# Patient Record
Sex: FEMALE | Race: WHITE | Employment: OTHER | URBAN - METROPOLITAN AREA
[De-identification: names, ages, dates, MRNs, and addresses within clinical notes are randomized per-mention and may not be internally consistent; named-entity substitution may affect disease eponyms.]

---

## 2017-01-12 DIAGNOSIS — N18.30 CKD (CHRONIC KIDNEY DISEASE), STAGE III (HCC): ICD-10-CM

## 2017-01-12 DIAGNOSIS — E87.5 HYPERKALEMIA: ICD-10-CM

## 2017-03-13 ENCOUNTER — OFFICE VISIT (OUTPATIENT)
Dept: INTERNAL MEDICINE CLINIC | Age: 82
End: 2017-03-13

## 2017-03-13 VITALS
OXYGEN SATURATION: 98 % | HEIGHT: 63 IN | TEMPERATURE: 98.2 F | HEART RATE: 77 BPM | WEIGHT: 121.4 LBS | DIASTOLIC BLOOD PRESSURE: 47 MMHG | SYSTOLIC BLOOD PRESSURE: 136 MMHG | RESPIRATION RATE: 12 BRPM | BODY MASS INDEX: 21.51 KG/M2

## 2017-03-13 DIAGNOSIS — R31.29 MICROSCOPIC HEMATURIA: Primary | ICD-10-CM

## 2017-03-13 DIAGNOSIS — R03.0 TEMPORARY HIGH BLOOD PRESSURE: ICD-10-CM

## 2017-03-13 DIAGNOSIS — Z13.220 SCREENING FOR HYPERLIPIDEMIA: ICD-10-CM

## 2017-03-13 DIAGNOSIS — Z23 ENCOUNTER FOR IMMUNIZATION: ICD-10-CM

## 2017-03-13 RX ORDER — TRAVOPROST 0.04 MG/ML
SOLUTION/ DROPS OPHTHALMIC
Refills: 3 | COMMUNITY
Start: 2017-02-13 | End: 2019-09-11 | Stop reason: ALTCHOICE

## 2017-03-13 NOTE — PATIENT INSTRUCTIONS
Patient has her Advanced Directive form completed and has turned this in today. Health Maintenance Due   Topic Date Due    DTaP/Tdap/Td series (1 - Tdap) 05/13/1956    Pneumococcal 65+ High/Highest Risk (2 of 2 - PPSV23) 11/07/2016          Reduced Vision: Care Instructions  Your Care Instructions    Reduced vision can be caused by many things. These include macular degeneration and glaucoma. When you can't see as well, daily life can be more challenging. But you can do some things to stay independent and keep doing the activities you enjoy. Follow-up care is a key part of your treatment and safety. Be sure to make and go to all appointments, and call your doctor if you are having problems. It's also a good idea to know your test results and keep a list of the medicines you take. How can you care for yourself at home? Use lighting  · Point lighting at what you want to see. Don't point it at your eyes. · Add lamps where you need extra lighting. · Use curtains or shades to adjust how much natural light there is. · Use good lighting in places where you could easily fall. These include entries and stairways. Use labels  · Label things that are hard to recognize or that could be confusing. This might include medicines, spices, and foods. Use black letters on a white background. Or you can color-code the items. · Shashi Bile the positions of the temperature settings you use the most on your stove and oven. Also neli the \"on\" and \"off\" positions. · Neli the water temperatures you use on faucets in the kitchen and bathroom. To prevent overfilling a sink or bathtub, use waterproof markers or tape to neli the water level you want. Avoid falls in your home  · Replace or remove any worn carpeting. Tape down or remove area rugs. · Do not wax your floors. Use nonskid, nonglare  on smooth floors. · Remove electrical cords from areas where you need to walk. Or tape them down so you won't trip on them.   · Make sure furniture doesn't stick out into areas where you walk. Keep chairs pushed in under tables and desks. Keep all drawers closed. · Keep doors fully opened or fully closed. Don't leave them nursing home open or shut. · Use handrails on stairways and ramps. Make sure that they go beyond the top and bottom steps. Then you won't stumble if you miss a step. Use helpful technology  · Use a magnifying lens. You can buy ones that you hold. Or you can buy ones that attach to glasses. Some have lights built in.  · If your budget allows, you may want to think about a video magnifier system. These systems can make print, pictures, or other items bigger on a screen. · If you have a computer:  ¨ Try to adjust the display. You can often change how big the text and pictures appear. Then they will be easier to see and read. ¨ You may want to try special software. Some software can recognize spoken commands or change dictated speech into text. Other software allows computers to speak text and read documents. · Use large-print items. These include books, newspapers, magazines, and medicine labels. You can also listen to recordings of books. · Think about using devices made for people with low vision. Examples are clocks and watches that announce the time. There are also clocks, telephones, and calculators with extra-large buttons. Be safe while you stay active  · Ask your doctor what physical activities are safe for you. If you bend, lift things, or move fast, it may affect your health or vision. · Ask a friend to read you the instructions for a new exercise and to check your technique. · Walk with someone who can help look for things that may be a danger. · If you swim laps, use a pool that has ropes between the lanes. When should you call for help? Call your doctor now or seek immediate medical care if:  · You have a sudden change in vision.   Watch closely for changes in your health, and be sure to contact your doctor if you have new changes in either eye. Where can you learn more? Go to http://danny-shonda.info/. Enter E315 in the search box to learn more about \"Reduced Vision: Care Instructions. \"  Current as of: May 23, 2016  Content Version: 11.1  © 7678-0868 [employer]. Care instructions adapted under license by [employer] (which disclaims liability or warranty for this information). If you have questions about a medical condition or this instruction, always ask your healthcare professional. [address] any warranty or liability for your use of this information.

## 2017-03-13 NOTE — MR AVS SNAPSHOT
Visit Information Date & Time Provider Department Dept. Phone Encounter #  
 3/13/2017  9:30 AM Giuseppe Velasquez MD [facility] [address] [place] [address]  Follow-up Instructions Return in about 6 months (around 9/13/2017) for BP check. Your Appointments 9/11/2017  9:30 AM  
Office Visit with Giuseppe Velasquez MD  
[facility] [address] [place] [address] [facility]) Appt Note: 6 mth f/u  
 [address] [facility], [address] [id number] [address] [place]  
  
   
 [address], [address] Upcoming Health Maintenance Date Due DTaP/Tdap/Td series (1 - Tdap) 5/13/1956 Pneumococcal 65+ High/Highest Risk (2 of 2 - PPSV23) 11/7/2016 MEDICARE YEARLY EXAM 9/13/2017 GLAUCOMA SCREENING Q2Y 4/11/2018 Allergies as of 3/13/2017  Review Complete On: 3/13/2017 By: Giuseppe Velasquez MD  
  
 Severity Noted Reaction Type Reactions Alphagan P [Brimonidine] High 09/14/2015    Itching Codeine  10/01/2012    Nausea and Vomiting Darvocet A500 [Propoxyphene N-acetaminophen]  10/01/2012    Nausea and Vomiting Darvon [Propoxyphene]  10/01/2012    Nausea and Vomiting Demerol [Meperidine]  10/01/2012    Nausea and Vomiting Entex [Phenylephrine-guaifenesin]  10/01/2012    Nausea and Vomiting Erythromycin  10/01/2012    Nausea and Vomiting Other Medication  10/01/2012    Shortness of Breath No beta blockers Sulfa (Sulfonamide Antibiotics)  10/01/2012    Nausea and Vomiting Timolol  10/01/2012    Nausea and Vomiting Vicodin [Hydrocodone-acetaminophen]  10/01/2012    Other (comments)  
 confusion Current Immunizations  Never Reviewed No immunizations on file. Not reviewed this visit You Were Diagnosed With   
  
 Codes Comments Microscopic hematuria    -  Primary ICD-10-CM: R31.29 ICD-9-CM: 599.72 Temporary high blood pressure     ICD-10-CM: I10 
ICD-9-CM: 796.2 Vitals BP Pulse Temp Resp Height(growth percentile) Weight(growth percentile) 136/47 (BP 1 Location: Left arm, BP Patient Position: Sitting) 77 98.2 °F (36.8 °C) (Oral) 12 5' 3\" (1.6 m) 121 lb 6.4 oz (55.1 kg) SpO2 BMI OB Status Smoking Status 98% 21.51 kg/m2 Hysterectomy Never Smoker BMI and BSA Data Body Mass Index Body Surface Area  
 21.51 kg/m 2 1.56 m 2 Preferred Pharmacy Pharmacy Name Phone [id number] [phone] Your Updated Medication List  
  
   
This list is accurate as of: 3/13/17 10:01 AM.  Always use your most recent med list.  
  
  
  
  
 aspirin 81 mg Cpdr  
Take 81 mg by mouth daily. Patient taking 2 81 mg tablets a day Calcium Carbonate-Vit D3-Min 600 mg calcium- 400 unit Tab Take  by mouth every other day. multivitamin tablet Commonly known as:  ONE A DAY Take 1 Tab by mouth every other day. TRAVATAN Z 0.004 % ophthalmic solution Generic drug:  travoprost  
PLACE 1 DROP IN RIGHT EYE AT BEDTIME Follow-up Instructions Return in about 6 months (around 9/13/2017) for BP check. Patient Instructions Patient has her Advanced Directive form completed and has turned this in today. Health Maintenance Due Topic Date Due  
 DTaP/Tdap/Td series (1 - Tdap) [birth year]/13/1956  Pneumococcal 65+ High/Highest Risk (2 of 2 - PPSV23) 11/07/2016 Reduced Vision: Care Instructions Your Care Instructions Reduced vision can be caused by many things. These include macular degeneration and glaucoma. When you can't see as well, daily life can be more challenging. But you can do some things to stay independent and keep doing the activities you enjoy. Follow-up care is a key part of your treatment and safety. Be sure to make and go to all appointments, and call your doctor if you are having problems.  It's also a good idea to know your test results and keep a list of the medicines you take. How can you care for yourself at home? Use lighting · Point lighting at what you want to see. Don't point it at your eyes. · Add lamps where you need extra lighting. · Use curtains or shades to adjust how much natural light there is. · Use good lighting in places where you could easily fall. These include entries and stairways. Use labels · Label things that are hard to recognize or that could be confusing. This might include medicines, spices, and foods. Use black letters on a white background. Or you can color-code the items. · Padmini Beards the positions of the temperature settings you use the most on your stove and oven. Also neli the \"on\" and \"off\" positions. · Neli the water temperatures you use on faucets in the kitchen and bathroom. To prevent overfilling a sink or bathtub, use waterproof markers or tape to neli the water level you want. Avoid falls in your home · Replace or remove any worn carpeting. Tape down or remove area rugs. · Do not wax your floors. Use nonskid, nonglare  on smooth floors. · Remove electrical cords from areas where you need to walk. Or tape them down so you won't trip on them. · Make sure furniture doesn't stick out into areas where you walk. Keep chairs pushed in under tables and desks. Keep all drawers closed. · Keep doors fully opened or fully closed. Don't leave them [facility] open or shut. · Use handrails on stairways and ramps. Make sure that they go beyond the top and bottom steps. Then you won't stumble if you miss a step. Use helpful technology · Use a magnifying lens. You can buy ones that you hold. Or you can buy ones that attach to glasses. Some have lights built in. 
· If your budget allows, you may want to think about a video magnifier system. These systems can make print, pictures, or other items bigger on a screen. · If you have a computer: ¨ Try to adjust the display.  You can often change how big the text and pictures appear. Then they will be easier to see and read. ¨ You may want to try special software. Some software can recognize spoken commands or change dictated speech into text. Other software allows computers to speak text and read documents. · Use large-print items. These include books, newspapers, magazines, and medicine labels. You can also listen to recordings of books. · Think about using devices made for people with low vision. Examples are clocks and watches that announce the time. There are also clocks, telephones, and calculators with extra-large buttons. Be safe while you stay active · Ask your doctor what physical activities are safe for you. If you bend, lift things, or move fast, it may affect your health or vision. · Ask a friend to read you the instructions for a new exercise and to check your technique. · Walk with someone who can help look for things that may be a danger. · If you swim laps, use a pool that has ropes between the lanes. When should you call for help? Call your doctor now or seek immediate medical care if: 
· You have a sudden change in vision. Watch closely for changes in your health, and be sure to contact your doctor if you have new changes in either eye. Where can you learn more? Go to http://danny-shonda.info/. Enter T244 in the search box to learn more about \"Reduced Vision: Care Instructions. \" Current as of: May 23, 2016 Content Version: 11.1 © 3935-5903 [employer] Care instructions adapted under license by [employer] (which disclaims liability or warranty for this information). If you have questions about a medical condition or this instruction, always ask your healthcare professional. [address] any warranty or liability for your use of this information. Introducing [facility]!    
 [employer] introduces [employer] patient portal. Now you can access parts of your medical record, email your doctor's office, and request medication refills online. 1. In your internet browser, go to [email or web address] 2. Click on the First Time User? Click Here link in the Sign In box. You will see the New Member Sign Up page. 3. Enter your [employer] Access Code exactly as it appears below. You will not need to use this code after youve completed the sign-up process. If you do not sign up before the expiration date, you must request a new code. · [employer] Access Code: [email or web address] Expires: 3/27/2017  3:49 PM 
 
4. Enter the last four digits of your Social Security Number (xxxx) and Date of Birth (mm/dd/yyyy) as indicated and click Submit. You will be taken to the next sign-up page. 5. Create a [employer] ID. This will be your [employer] login ID and cannot be changed, so think of one that is secure and easy to remember. 6. Create a [employer] password. You can change your password at any time. 7. Enter your Password Reset Question and Answer. This can be used at a later time if you forget your password. 8. Enter your e-mail address. You will receive e-mail notification when new information is available in [address] 9. Click Sign Up. You can now view and download portions of your medical record. 10. Click the Download Summary menu link to download a portable copy of your medical information. If you have questions, please visit the Frequently Asked Questions section of the [employer] website. Remember, [employer] is NOT to be used for urgent needs. For medical emergencies, dial 911. Now available from your iPhone and Android! Please provide this summary of care documentation to your next provider. Your primary care clinician is listed as Antoniette Form. If you have any questions after today's visit, please call [phone].

## 2017-03-13 NOTE — ACP (ADVANCE CARE PLANNING)
Advance Care Planning (ACP) Provider Conversation Snapshot    Date of ACP Conversation: 03/13/17  Persons included in Conversation:  patient  Length of ACP Conversation in minutes:  <16 minutes (Non-Billable)    Authorized Decision Maker (if patient is incapable of making informed decisions): This person is:   Healthcare Agent/Medical Power of  under Advance Directive - her sonYenifer    For Patients with Decision Making Capacity:   Values/Goals: Exploration of values, goals, and preferences if recovery is not expected, even with continued medical treatment in the event of:  Imminent death  Severe, permanent brain injury    Conversation Outcomes / Follow-Up Plan:   Completed new Advance Directive okay; pt states that she wants to pass away naturally if death is imminent and no interventions will change this. Additionally, pt wants no life support measures in the event that pt is unaware of her surroundings or her self. She is ok with donating her organs at the time of death. This was all reiterated with the pt. All questions were answered. We reviewed the advanced directive forms page by page and completed this today in clinic.     Dr. Sam Grate  [facility] [address].  Phone: [phone]  Fax: [phone]

## 2017-03-13 NOTE — PROGRESS NOTES
Chief Complaint   Patient presents with    Hypertension     follow up     Patient has her Advanced Directive form completed and has turned this in today. 1. Have you been to the ER, urgent care clinic since your last visit? Hospitalized since your last visit? No    2. Have you seen or consulted any other health care providers outside of the [address] [place] [address] since your last visit? Include any pap smears or colon screening.  No

## 2017-09-11 ENCOUNTER — OFFICE VISIT (OUTPATIENT)
Dept: INTERNAL MEDICINE CLINIC | Age: 82
End: 2017-09-11

## 2017-09-11 VITALS
SYSTOLIC BLOOD PRESSURE: 136 MMHG | HEIGHT: 63 IN | WEIGHT: 123.6 LBS | TEMPERATURE: 97.8 F | BODY MASS INDEX: 21.9 KG/M2 | RESPIRATION RATE: 12 BRPM | HEART RATE: 75 BPM | DIASTOLIC BLOOD PRESSURE: 50 MMHG | OXYGEN SATURATION: 98 %

## 2017-09-11 DIAGNOSIS — N18.30 CKD (CHRONIC KIDNEY DISEASE), STAGE III (HCC): ICD-10-CM

## 2017-09-11 DIAGNOSIS — H40.9 GLAUCOMA, UNSPECIFIED GLAUCOMA, UNSPECIFIED LATERALITY: ICD-10-CM

## 2017-09-11 DIAGNOSIS — R31.29 MICROSCOPIC HEMATURIA: Primary | ICD-10-CM

## 2017-09-11 DIAGNOSIS — R03.0 WHITE COAT SYNDROME WITHOUT HYPERTENSION: ICD-10-CM

## 2017-09-11 DIAGNOSIS — Z13.220 SCREENING FOR HYPERLIPIDEMIA: ICD-10-CM

## 2017-09-11 NOTE — PROGRESS NOTES
Chief Complaint   Patient presents with    Hypertension     follow up     1. Have you been to the ER, urgent care clinic since your last visit? Hospitalized since your last visit? No    2. Have you seen or consulted any other health care providers outside of the [address] [place] [address] since your last visit? Include any pap smears or colon screening.  No

## 2017-09-11 NOTE — PROGRESS NOTES
INTERNISTS OF [facility]:  9/11/2017, MRN: [id number]      Lele Garcia is a 80 y.o. female and presents to clinic for Hypertension (follow up)    Subjective:   Patient is an 24-year-old female with history of cerebral atrophy per head CT findings, white coat hypertension, urethral polyp seen on cystoscopy, microscopic hematuria, glaucoma, chronic kidney disease stage III, history of TIA. 1.  Temporary elevated blood pressure: The patient has a history of elevated blood pressures in clinic. Home blood pressure logs have been unremarkable though. Her blood pressure was initially elevated today in clinic but with rest, declined to 136/50. This confirms her diagnosis of whitecoat hypertension. 2.  Glaucoma: She is followed by the ophthalmology team.  She has an appointment coming up within next month. She is on Travatan eyedrops. She reports no change in her vision since her last visit. 3.  Microscopic hematuria: She is followed by the urology team for history of microscopic hematuria. She denies dysuria and gross hematuria since her last appointment. No hematochezia, melena, or vaginal bleeding. She has a history of CKD that is thought to be secondary to underlying age related creatinine reduction. 4.  Health maintenance: The patient declined all vaccinations today. The patient had lab work done by [facility] last month. We do not have records of these results.       Patient Active Problem List    Diagnosis Date Noted    Cerebral atrophy - seen on head CT 12/27/2016    Urethral polyp - seen on cytoscopy (thought to be the source of her microscopic hematuria) 12/27/2016    Microscopic hematuria 12/27/2016    Glaucoma 07/16/2013    CKD (chronic kidney disease), stage III 07/16/2013    H/O TIA (transient ischemic attack) and stroke 06/05/2013       Current Outpatient Prescriptions   Medication Sig Dispense Refill    TRAVATAN Z 0.004 % ophthalmic solution PLACE 1 DROP IN RIGHT EYE AT BEDTIME  3  Calcium Carbonate-Vit D3-Min 600 mg calcium- 400 unit tab Take  by mouth every other day.  aspirin 81 mg CpDR Take 81 mg by mouth daily. Patient taking 2 81 mg tablets a day      multivitamin (ONE A DAY) tablet Take 1 Tab by mouth every other day. Allergies   Allergen Reactions    Alphagan P [Brimonidine] Itching    Codeine Nausea and Vomiting    Darvocet A500 [Propoxyphene N-Acetaminophen] Nausea and Vomiting    Darvon [Propoxyphene] Nausea and Vomiting    Demerol [Meperidine] Nausea and Vomiting    Entex [Phenylephrine-Guaifenesin] Nausea and Vomiting    Erythromycin Nausea and Vomiting    Other Medication Shortness of Breath     No beta blockers    Sulfa (Sulfonamide Antibiotics) Nausea and Vomiting    Timolol Nausea and Vomiting    Vicodin [Hydrocodone-Acetaminophen] Other (comments)     confusion       Past Medical History:   Diagnosis Date    Breast CA [facility]) [id number]    1984 left, 1986 right    CKD (chronic kidney disease) stage 3, GFR 30-59 ml/min     Glaucoma     TIA (transient ischemic attack) 2009       Past Surgical History:   Procedure Laterality Date    HX CATARACT REMOVAL Left 2006    [phone]       Family History   Problem Relation Age of Onset    Cancer Mother      breast    Hypertension Mother     Arthritis-osteo Mother     Dementia Mother     Hypertension Father     Heart Disease Father     Arthritis-osteo Father     Hypertension Sister     Diabetes Sister     Cancer Brother      lung    Hypertension Brother     Heart Disease Brother        Social History   Substance Use Topics    Smoking status: Never Smoker    Smokeless tobacco: Never Used    Alcohol use No       ROS   Review of Systems   Constitutional: Negative for chills and fever. HENT: Negative for ear pain and sore throat. Eyes: Negative for pain. Respiratory: Negative for cough and shortness of breath.     Cardiovascular: Negative for chest pain.   Gastrointestinal: Negative for abdominal pain, blood in stool and melena. Genitourinary: Negative for dysuria and hematuria. Musculoskeletal: Negative for joint pain and myalgias. Skin: Negative for rash. Neurological: Negative for tingling, focal weakness and headaches. Endo/Heme/Allergies: Does not bruise/bleed easily. Psychiatric/Behavioral: Negative for substance abuse. Objective     Vitals:    09/11/17 0917 09/11/17 0932   BP: 151/53 136/50   Pulse: 78 75   Resp: 12    Temp: 97.8 °F (36.6 °C)    TempSrc: Oral    SpO2: 98%    Weight: 123 lb 9.6 oz (56.1 kg)    Height: 5' 3\" (1.6 m)    PainSc:   0 - No pain        Physical Exam   Constitutional: She is oriented to person, place, and time and well-developed, well-nourished, and in no distress. HENT:   Head: Normocephalic and atraumatic. Right Ear: External ear normal.   Left Ear: External ear normal.   Nose: Nose normal.   Mouth/Throat: Oropharynx is clear and moist. No oropharyngeal exudate. Clear TMs   Eyes: Conjunctivae and EOM are normal. Right eye exhibits no discharge. Left eye exhibits no discharge. No scleral icterus. Neck: Neck supple. Cardiovascular: Normal rate, regular rhythm, normal heart sounds and intact distal pulses. Exam reveals no gallop and no friction rub. No murmur heard. Pulmonary/Chest: Effort normal and breath sounds normal. No respiratory distress. She has no wheezes. She has no rales. Abdominal: Soft. Bowel sounds are normal. She exhibits no distension. There is no tenderness. There is no rebound and no guarding. Musculoskeletal: She exhibits no edema or tenderness (BUE are NTTP). Lymphadenopathy:     She has no cervical adenopathy. Neurological: She is alert and oriented to person, place, and time. She exhibits normal muscle tone. Gait normal.   Skin: Skin is warm and dry. No erythema. Psychiatric: Affect normal.   Nursing note and vitals reviewed.       LABS   Data Review:   Lab Results Component Value Date/Time    WBC 4.5 04/03/2014 12:00 AM    HGB 15.3 04/03/2014 12:00 AM    HCT 47.3 04/03/2014 12:00 AM    PLATELET 854 66/25/2816 12:00 AM    MCV 95 04/03/2014 12:00 AM       Lab Results   Component Value Date/Time    Sodium 145 04/03/2014 12:00 AM    Potassium 4.8 04/03/2014 12:00 AM    Chloride 103 04/03/2014 12:00 AM    CO2 28 04/03/2014 12:00 AM    Glucose 89 04/03/2014 12:00 AM    BUN 17 04/03/2014 12:00 AM    Creatinine 1.05 04/03/2014 12:00 AM    BUN/Creatinine ratio 16 04/03/2014 12:00 AM    GFR est AA 59 04/03/2014 12:00 AM    GFR est non-AA 51 04/03/2014 12:00 AM    Calcium 9.8 04/03/2014 12:00 AM       Lab Results   Component Value Date/Time    Cholesterol, total 229 04/03/2014 12:00 AM    HDL Cholesterol 62 04/03/2014 12:00 AM    LDL, calculated 140 04/03/2014 12:00 AM    VLDL, calculated 27 04/03/2014 12:00 AM    Triglyceride 135 04/03/2014 12:00 AM     Assessment/Plan:   1. Microscopic hematuria: Stable. -Continue following up with the urology team.  -I am requesting a copy of her last blood test results from [facility] labs. I will review her creatinine and CBC. 2. White Coat Hypertension: Stable. -Return to clinic in 6 months to recheck her blood pressure.  -Requesting results from [facility] labs. 3. Health Maintenance:  -I encouraged patient to follow-up with the ophthalmology team for continued management of her glaucoma. There are no preventive care reminders to display for this patient. Lab review: labs are reviewed in the EHR. We are requesting copies of her most recent lab results from [facility] labs. I have discussed the diagnosis with the patient and the intended plan as seen in the above orders. The patient has received an after-visit summary and questions were answered concerning future plans. I have discussed medication side effects and warnings with the patient as well.  I have reviewed the plan of care with the patient, accepted their input and they are in agreement with the treatment goals. All questions were answered. The patient understands the plan of care. Handouts provided today with above information. Pt instructed if symptoms worsen to call the office or report to the ED for continued care. Greater than 50% of the visit time was spent in counseling and/or coordination of care. Follow-up Disposition:  Return in about 6 months (around 3/11/2018) for BP check.     Tavares Odonnell MD

## 2017-09-11 NOTE — PATIENT INSTRUCTIONS
Health Maintenance Due   Topic Date Due    DTaP/Tdap/Td series (1 - Tdap) 05/13/1956          Body Mass Index: Care Instructions  Your Care Instructions    Body mass index (BMI) can help you see if your weight is raising your risk for health problems. It uses a formula to compare how much you weigh with how tall you are. · A BMI lower than 18.5 is considered underweight. · A BMI between 18.5 and 24.9 is considered healthy. · A BMI between 25 and 29.9 is considered overweight. A BMI of 30 or higher is considered obese. If your BMI is in the normal range, it means that you have a lower risk for weight-related health problems. If your BMI is in the overweight or obese range, you may be at increased risk for weight-related health problems, such as high blood pressure, heart disease, stroke, arthritis or joint pain, and diabetes. If your BMI is in the underweight range, you may be at increased risk for health problems such as fatigue, lower protection (immunity) against illness, muscle loss, bone loss, hair loss, and hormone problems. BMI is just one measure of your risk for weight-related health problems. You may be at higher risk for health problems if you are not active, you eat an unhealthy diet, or you drink too much alcohol or use tobacco products. Follow-up care is a key part of your treatment and safety. Be sure to make and go to all appointments, and call your doctor if you are having problems. It's also a good idea to know your test results and keep a list of the medicines you take. How can you care for yourself at home? · Practice healthy eating habits. This includes eating plenty of fruits, vegetables, whole grains, lean protein, and low-fat dairy. · If your doctor recommends it, get more exercise. Walking is a good choice. Bit by bit, increase the amount you walk every day. Try for at least 30 minutes on most days of the week. · Do not smoke. Smoking can increase your risk for health problems.  If you need help quitting, talk to your doctor about stop-smoking programs and medicines. These can increase your chances of quitting for good. · Limit alcohol to 2 drinks a day for men and 1 drink a day for women. Too much alcohol can cause health problems. If you have a BMI higher than 25  · Your doctor may do other tests to check your risk for weight-related health problems. This may include measuring the distance around your waist. A waist measurement of more than 40 inches in men or 35 inches in women can increase the risk of weight-related health problems. · Talk with your doctor about steps you can take to stay healthy or improve your health. You may need to make lifestyle changes to lose weight and stay healthy, such as changing your diet and getting regular exercise. If you have a BMI lower than 18.5  · Your doctor may do other tests to check your risk for health problems. · Talk with your doctor about steps you can take to stay healthy or improve your health. You may need to make lifestyle changes to gain or maintain weight and stay healthy, such as getting more healthy foods in your diet and doing exercises to build muscle. Where can you learn more? Go to http://danny-shonda.info/. Enter S176 in the search box to learn more about \"Body Mass Index: Care Instructions. \"  Current as of: January 23, 2017  Content Version: 11.3  © 1402-2589 [employer]. Care instructions adapted under license by [employer] (which disclaims liability or warranty for this information). If you have questions about a medical condition or this instruction, always ask your healthcare professional. [place] [address] any warranty or liability for your use of this information.

## 2017-09-19 ENCOUNTER — TELEPHONE (OUTPATIENT)
Dept: INTERNAL MEDICINE CLINIC | Age: 82
End: 2017-09-19

## 2017-09-19 NOTE — TELEPHONE ENCOUNTER
Patient says at last visit Dr. Lex Abernathy gotten her results from [address] [place] [address]. Was supposed to get a copy mailed to her. Did you ever get lab results?

## 2017-09-19 NOTE — TELEPHONE ENCOUNTER
Called [facility] the only lab they have record of is bmp from August 2017 will mail to patient when we receive them

## 2018-03-12 ENCOUNTER — OFFICE VISIT (OUTPATIENT)
Dept: INTERNAL MEDICINE CLINIC | Age: 83
End: 2018-03-12

## 2018-03-12 VITALS
SYSTOLIC BLOOD PRESSURE: 142 MMHG | HEART RATE: 79 BPM | WEIGHT: 125.8 LBS | RESPIRATION RATE: 12 BRPM | HEIGHT: 63 IN | TEMPERATURE: 98.1 F | DIASTOLIC BLOOD PRESSURE: 55 MMHG | OXYGEN SATURATION: 98 % | BODY MASS INDEX: 22.29 KG/M2

## 2018-03-12 DIAGNOSIS — N18.30 CKD (CHRONIC KIDNEY DISEASE), STAGE III (HCC): ICD-10-CM

## 2018-03-12 DIAGNOSIS — Z71.89 ADVANCE CARE PLANNING: ICD-10-CM

## 2018-03-12 DIAGNOSIS — Z00.00 MEDICARE ANNUAL WELLNESS VISIT, SUBSEQUENT: Primary | ICD-10-CM

## 2018-03-12 DIAGNOSIS — H40.9 GLAUCOMA, UNSPECIFIED GLAUCOMA TYPE, UNSPECIFIED LATERALITY: ICD-10-CM

## 2018-03-12 DIAGNOSIS — Z85.3 HISTORY OF BREAST CANCER: ICD-10-CM

## 2018-03-12 DIAGNOSIS — Z13.220 SCREENING FOR HYPERLIPIDEMIA: ICD-10-CM

## 2018-03-12 DIAGNOSIS — Z13.39 SCREENING FOR ALCOHOLISM: ICD-10-CM

## 2018-03-12 NOTE — ACP (ADVANCE CARE PLANNING)
Advance Care Planning  Advance Care Planning (ACP) Provider Conversation     Date of ACP Conversation: 03/12/18  Persons included in Conversation:  patient    Authorized Decision Maker (if patient is incapable of making informed decisions): This person is:   Healthcare Agent/Medical Power of  under Advance Directive          For Patients with Decision Making Capacity:   Values/Goals: Exploration of values, goals, and preferences if recovery is not expected, even with continued medical treatment in the event of:  Imminent death  Severe, permanent brain injury    Conversation Outcomes / Follow-Up Plan:   Recommended completion of Advance Directive form after review of ACP materials and conversation with prospective healthcare agent . The patient has no changes to make to her pre-existing advanced directive.     Dr. Susan Mitchell  [facility] [address]  Phone: [phone]  Fax: [phone]

## 2018-03-12 NOTE — MR AVS SNAPSHOT
[facility] 
 
 
 [address], [address] [place] [address] [facility] 
[phone] Patient: Constance Rinne MRN: [id number] FYN:[id number] Visit Information Date & Time Provider Department Dept. Phone Encounter #  
 3/12/2018  9:00 AM Teena Singh MD [facility] [phone] [id number] Follow-up Instructions Return in about 7 months (around 10/12/2018). Your Appointments 9/11/2018  9:00 AM  
Office Visit with Teena Singh MD  
[facility]) Appt Note: ov per [facility] [address], [address] [place] [address] [address] [place]  
  
   
 [address], [address] Upcoming Health Maintenance Date Due  
 MEDICARE YEARLY EXAM 9/13/2017 GLAUCOMA SCREENING Q2Y 4/11/2018 DTaP/Tdap/Td series (2 - Td) 3/12/2028 Allergies as of 3/12/2018  Review Complete On: 3/12/2018 By: Teena Singh MD  
  
 Severity Noted Reaction Type Reactions Alphagan P [Brimonidine] High 09/14/2015    Itching Codeine  10/01/2012    Nausea and Vomiting Darvocet A500 [Propoxyphene N-acetaminophen]  10/01/2012    Nausea and Vomiting Darvon [Propoxyphene]  10/01/2012    Nausea and Vomiting Demerol [Meperidine]  10/01/2012    Nausea and Vomiting Entex [Phenylephrine-guaifenesin]  10/01/2012    Nausea and Vomiting Erythromycin  10/01/2012    Nausea and Vomiting Other Medication  10/01/2012    Shortness of Breath No beta blockers Sulfa (Sulfonamide Antibiotics)  10/01/2012    Nausea and Vomiting Timolol  10/01/2012    Nausea and Vomiting Vicodin [Hydrocodone-acetaminophen]  10/01/2012    Other (comments)  
 confusion Current Immunizations  Never Reviewed Name Date Pneumococcal Polysaccharide (PPSV-23) 3/13/2017 Not reviewed this visit You Were Diagnosed With   
  
 Codes Comments Medicare annual wellness visit, subsequent    -  Primary ICD-10-CM: Z00.00 ICD-9-CM: V70.0 CKD (chronic kidney disease), stage III     ICD-10-CM: N18.3 ICD-9-CM: 688. 3 Glaucoma, unspecified glaucoma type, unspecified laterality     ICD-10-CM: H40.9 ICD-9-CM: 365.9 Screening for hyperlipidemia     ICD-10-CM: Z13.220 ICD-9-CM: V77.91 Screening for alcoholism     ICD-10-CM: Z13.89 ICD-9-CM: V79.1 Vitals BP Pulse Temp Resp Height(growth percentile) Weight(growth percentile) 142/55 (BP 1 Location: Left arm, BP Patient Position: Sitting) 79 98.1 °F (36.7 °C) (Oral) 12 5' 3\" (1.6 m) 125 lb 12.8 oz (57.1 kg) SpO2 BMI OB Status Smoking Status 98% 22.28 kg/m2 Hysterectomy Never Smoker Vitals History BMI and BSA Data Body Mass Index Body Surface Area  
 22.28 kg/m 2 1.59 m 2 Preferred Pharmacy Pharmacy Name Phone [id number] Romero [phone] Your Updated Medication List  
  
   
This list is accurate as of 3/12/18  9:38 AM.  Always use your most recent med list.  
  
  
  
  
 aspirin 81 mg Cpdr  
Take 81 mg by mouth daily. Patient taking 2 81 mg tablets a day Calcium Carbonate-Vit D3-Min 600 mg calcium- 400 unit Tab Take  by mouth every other day. multivitamin tablet Commonly known as:  ONE A DAY Take 1 Tab by mouth every other day. TRAVATAN Z 0.004 % ophthalmic solution Generic drug:  travoprost  
PLACE 1 DROP IN RIGHT EYE AT BEDTIME We Performed the Following MD ANNUAL ALCOHOL SCREEN 15 MIN [id number] [facility]] Follow-up Instructions Return in about 7 months (around 10/12/2018). To-Do List   
 03/12/2018 Lab:  LIPID PANEL Around 03/12/2018 Lab:  METABOLIC PANEL, BASIC Patient Instructions Health Maintenance Due Topic Date Due  MEDICARE YEARLY EXAM  09/13/2017  GLAUCOMA SCREENING Q2Y  04/11/2018 Medicare Part B Preventive Services Limitations Recommendation Scheduled Bone Mass Measurement 
(age 72 & older, biennial) Requires diagnosis related to osteoporosis or estrogen deficiency. Biennial benefit unless patient has history of long-term glucocorticoid tx or baseline is needed because initial test was by other method This should be done at age 72 and again if on osteoporosis medication at 2-3 year intervals. Overdue Cardiovascular Screening Blood Tests (every 5 years) Total cholesterol, HDL, Triglycerides Order as a panel if possible We should check your cholesterol panel at least once every 5 years. Overdue Colorectal Cancer Screening 
-Fecal occult blood test (annual) -Flexible sigmoidoscopy (5y) 
-Screening colonoscopy (10y) -Barium Enema  Due per your Gastroenterologist's recommendations. Overdue Counseling to Prevent Tobacco Use (up to 8 sessions per year) - Counseling greater than 3 and up to 10 minutes - Counseling greater than 10 minutes Patients must be asymptomatic of tobacco-related conditions to receive as preventive service Continue with smoking cessation Diabetes Screening Tests (at least every 3 years, Medicare covers annually or at 6-month intervals for prediabetic patients) Fasting blood sugar (FBS) or glucose tolerance test (GTT) Patient must be diagnosed with one of the following: 
-Hypertension, Dyslipidemia, obesity, previous impaired FBS or GTT 
Or any two of the following: overweight, FH of diabetes, age ? 72, history of gestational diabetes, birth of baby weighing more than 9 pounds Should be done yearly Up to date Diabetes Self-Management Training (DSMT) (no USPSTF recommendation) Requires referral by treating physician for patient with diabetes or renal disease. 10 hours of initial DSMT session of no less than 30 minutes each in a continuous 12-month period. 2 hours of follow-up DSMT in subsequent years. Not applicable Glaucoma Screening (no USPSTF recommendation) Diabetes mellitus, family history, , age 48 or over,  American, age 72 or over Continue with annual eye exams. Overdue per our records Human Immunodeficiency Virus (HIV) Screening (annually for increased risk patients) HIV-1 and HIV-2 by EIA, MARGARET, rapid antibody test, or oral mucosa transudate Patient must be at increased risk for HIV infection per USPSTF guidelines or pregnant. Tests covered annually for patients at increased risk. Pregnant patients may receive up to 3 test during pregnancy. Not applicable Medical Nutrition Therapy (MNT) (for diabetes or renal disease not recommended schedule) Requires referral by treating physician for patient with diabetes or renal disease. Can be provided in same year as diabetes self-management training (DSMT), and CMS recommends medical nutrition therapy take place after DSMT. Up to 3 hours for initial year and 2 hours in subsequent years. Not applicable Shingles Vaccination A shingles vaccine is also recommended once in a lifetime after age 61 Vaccination recommended for shingles vaccination. Overdue Seasonal Influenza Vaccination (annually)  Continue with yearly \"flu\" shot annually Overdue Pneumococcal Vaccination (once after 65)  Please receive this vaccination at age 72. Overdue Hepatitis B Vaccinations (if medium/high risk) Medium/high risk factors:  End-stage renal disease, Hemophiliacs who received Factor VIII or IX concentrates, Clients of institutions for the mentally retarded, Persons who live in the same house as a HepB virus carrier, Homosexual men, Illicit injectable drug abusers. Not applicable Screening Mammography (biennial age 54-69) Annually (age 36 or over) You need a mammogram yearly to screen for breast cancer. Overdue Screening Pap Tests and Pelvic Examination (up to age 79 and after 79 if unknown history or abnormal study last 10 years) Every 24 months except high risk You need no Pap smear at this time. Ultrasound Screening for Abdominal Aortic Aneurysm (AAA) (once) Patient must be referred through IPPE and not have had a screening for abdominal aortic aneurysm before under Medicare. Limited to patients who meet one of the following criteria: 
- Men who are 73-68 years old and have smoked more than 100 cigarettes in their lifetime. 
-Anyone with a FH of AAA 
-Anyone recommended for screening by USPSTF Not applicable High Blood Pressure: Care Instructions Your Care Instructions If your blood pressure is usually above 140/90, you have high blood pressure, or hypertension. That means the top number is 140 or higher or the bottom number is 90 or higher, or both. Despite what a lot of people think, high blood pressure usually doesn't cause headaches or make you feel dizzy or lightheaded. It usually has no symptoms. But it does increase your risk for heart attack, stroke, and kidney or eye damage. The higher your blood pressure, the more your risk increases. Your doctor will give you a goal for your blood pressure. Your goal will be based on your health and your age. An example of a goal is to keep your blood pressure below 140/90. Lifestyle changes, such as eating healthy and being active, are always important to help lower blood pressure. You might also take medicine to reach your blood pressure goal. 
Follow-up care is a key part of your treatment and safety. Be sure to make and go to all appointments, and call your doctor if you are having problems. It's also a good idea to know your test results and keep a list of the medicines you take. How can you care for yourself at home? Medical treatment · If you stop taking your medicine, your blood pressure will go back up. You may take one or more types of medicine to lower your blood pressure. Be safe with medicines. Take your medicine exactly as prescribed.  Call your doctor if you think you are having a problem with your medicine. · Talk to your doctor before you start taking aspirin every day. Aspirin can help certain people lower their risk of a heart attack or stroke. But taking aspirin isn't right for everyone, because it can cause serious bleeding. · See your doctor regularly. You may need to see the doctor more often at first or until your blood pressure comes down. · If you are taking blood pressure medicine, talk to your doctor before you take decongestants or anti-inflammatory medicine, such as ibuprofen. Some of these medicines can raise blood pressure. · Learn how to check your blood pressure at home. Lifestyle changes · Stay at a healthy weight. This is especially important if you put on weight around the waist. Losing even 10 pounds can help you lower your blood pressure. · If your doctor recommends it, get more exercise. Walking is a good choice. Bit by bit, increase the amount you walk every day. Try for at least 30 minutes on most days of the week. You also may want to swim, bike, or do other activities. · Avoid or limit alcohol. Talk to your doctor about whether you can drink any alcohol. · Try to limit how much sodium you eat to less than 2,300 milligrams (mg) a day. Your doctor may ask you to try to eat less than 1,500 mg a day. · Eat plenty of fruits (such as bananas and oranges), vegetables, legumes, whole grains, and low-fat dairy products. · Lower the amount of saturated fat in your diet. Saturated fat is found in animal products such as milk, cheese, and meat. Limiting these foods may help you lose weight and also lower your risk for heart disease. · Do not smoke. Smoking increases your risk for heart attack and stroke. If you need help quitting, talk to your doctor about stop-smoking programs and medicines. These can increase your chances of quitting for good. When should you call for help? Call 911 anytime you think you may need emergency care. This may mean having symptoms that suggest that your blood pressure is causing a serious heart or blood vessel problem. Your blood pressure may be over 180/110. ? For example, call 911 if: 
? · You have symptoms of a heart attack. These may include: ¨ Chest pain or pressure, or a strange feeling in the chest. 
¨ Sweating. ¨ Shortness of breath. ¨ Nausea or vomiting. ¨ Pain, pressure, or a strange feeling in the back, neck, jaw, or upper belly or in one or both shoulders or arms. ¨ Lightheadedness or sudden weakness. ¨ A fast or irregular heartbeat. ? · You have symptoms of a stroke. These may include: 
¨ Sudden numbness, tingling, weakness, or loss of movement in your face, arm, or leg, especially on only one side of your body. ¨ Sudden vision changes. ¨ Sudden trouble speaking. ¨ Sudden confusion or trouble understanding simple statements. ¨ Sudden problems with walking or balance. ¨ A sudden, severe headache that is different from past headaches. ? · You have severe back or belly pain. ?Do not wait until your blood pressure comes down on its own. Get help right away. ?Call your doctor now or seek immediate care if: 
? · Your blood pressure is much higher than normal (such as 180/110 or higher), but you don't have symptoms. ? · You think high blood pressure is causing symptoms, such as: ¨ Severe headache. ¨ Blurry vision. ? Watch closely for changes in your health, and be sure to contact your doctor if: 
? · Your blood pressure measures 140/90 or higher at least 2 times. That means the top number is 140 or higher or the bottom number is 90 or higher, or both. ? · You think you may be having side effects from your blood pressure medicine. ? · Your blood pressure is usually normal, but it goes above normal at least 2 times. Where can you learn more? Go to http://danny-shonda.info/. Enter N842 in the search box to learn more about \"High Blood Pressure: Care Instructions. \" Current as of: September 21, 2016 Content Version: 11.4 © 4325-8090 [employer]. Care instructions adapted under license by [employer] (which disclaims liability or warranty for this information). If you have questions about a medical condition or this instruction, always ask your healthcare professional. [place] [address] any warranty or liability for your use of this information. Medicare Wellness Visit, Female The best way to live healthy is to have a healthy lifestyle by eating a well-balanced diet, exercising regularly, limiting alcohol and stopping smoking. Regular physical exams and screening tests are another way to keep healthy. Preventive exams provided by your health care provider can find health problems before they become diseases or illnesses. Preventive services including immunizations, screening tests, monitoring and exams can help you take care of your own health. All people over age 72 should have a pneumovax  and and a prevnar shot to prevent pneumonia. These are once in a lifetime unless you and your provider decide differently. All people over 65 should have a yearly flu shot and a tetanus vaccine every 10 years. A bone mass density to screen for osteoporosis or thinning of the bones should be done every 2 years after 65. Screening for diabetes mellitus with a blood sugar test should be done every year. Glaucoma is a disease of the eye due to increased ocular pressure that can lead to blindness and it should be done every year by an eye professional. 
 
Cardiovascular screening tests that check for elevated lipids (fatty part of blood) which can lead to heart disease and strokes should be done every 5 years.  
 
Colorectal screening that evaluates for blood or polyps in your colon should be done yearly as a stool test or every five years as a flexible sigmoidoscope or every 10 years as a colonoscopy up to age 76. Breast cancer screening with a mammogram is recommended biennially  for women age 54-69. Screening for cervical cancer with a pap smear and pelvic exam is recommended for women after age 72 years every 2 years up to age 79 or when the provider and patient decide to stop. If there is a history of cervical abnormalities or other increased risk for cancer then the test is recommended yearly. Hepatitis C screening is also recommended for anyone born between 80 through [address]. A shingles vaccine is also recommended once in a lifetime after age 61. Your Medicare Wellness Exam is recommended annually. Here is a list of your current Health Maintenance items with a due date: 
Health Maintenance Due Topic Date Due  
 Annual Well Visit  09/13/2017  Glaucoma Screening   04/11/2018 Introducing [facility]! [facility] introduces [employer] patient portal. Now you can access parts of your medical record, email your doctor's office, and request medication refills online. 1. In your internet browser, go to [email or web address] 2. Click on the First Time User? Click Here link in the Sign In box. You will see the New Member Sign Up page. 3. Enter your [employer] Access Code exactly as it appears below. You will not need to use this code after youve completed the sign-up process. If you do not sign up before the expiration date, you must request a new code. · [employer] Access Code: [email or web address] Expires: 6/10/2018  9:38 AM 
 
4. Enter the last four digits of your Social Security Number (xxxx) and Date of Birth (mm/dd/yyyy) as indicated and click Submit. You will be taken to the next sign-up page. 5. Create a SpectraRep ID. This will be your [employer] login ID and cannot be changed, so think of one that is secure and easy to remember. 6. Create a [employer] password. You can change your password at any time. 7. Enter your Password Reset Question and Answer. This can be used at a later time if you forget your password. 8. Enter your e-mail address. You will receive e-mail notification when new information is available in [address] 9. Click Sign Up. You can now view and download portions of your medical record. 10. Click the Download Summary menu link to download a portable copy of your medical information. If you have questions, please visit the Frequently Asked Questions section of the [employer] website. Remember, [employer] is NOT to be used for urgent needs. For medical emergencies, dial 911. Now available from your iPhone and Android! Please provide this summary of care documentation to your next provider. Your primary care clinician is listed as Roxie Wang. If you have any questions after today's visit, please call [phone].

## 2018-03-12 NOTE — PROGRESS NOTES
Chief Complaint   Patient presents with    Hypertension     follow up     1. Have you been to the ER, urgent care clinic since your last visit? Hospitalized since your last visit? No    2. Have you seen or consulted any other health care providers outside of the [address] [place] [address] since your last visit? Include any pap smears or colon screening.  No

## 2018-03-12 NOTE — PATIENT INSTRUCTIONS
Health Maintenance Due   Topic Date Due    MEDICARE YEARLY EXAM  09/13/2017    GLAUCOMA SCREENING Q2Y  04/11/2018     Medicare Part B Preventive Services Limitations Recommendation Scheduled   Bone Mass Measurement  (age 72 & older, biennial) Requires diagnosis related to osteoporosis or estrogen deficiency. Biennial benefit unless patient has history of long-term glucocorticoid tx or baseline is needed because initial test was by other method This should be done at age 72 and again if on osteoporosis medication at 2-3 year intervals. Overdue   Cardiovascular Screening Blood Tests (every 5 years)  Total cholesterol, HDL, Triglycerides Order as a panel if possible We should check your cholesterol panel at least once every 5 years. Overdue   Colorectal Cancer Screening  -Fecal occult blood test (annual)  -Flexible sigmoidoscopy (5y)  -Screening colonoscopy (10y)  -Barium Enema  Due per your Gastroenterologist's recommendations. Overdue   Counseling to Prevent Tobacco Use (up to 8 sessions per year)  - Counseling greater than 3 and up to 10 minutes  - Counseling greater than 10 minutes Patients must be asymptomatic of tobacco-related conditions to receive as preventive service Continue with smoking cessation    Diabetes Screening Tests (at least every 3 years, Medicare covers annually or at 6-month intervals for prediabetic patients)    Fasting blood sugar (FBS) or glucose tolerance test (GTT) Patient must be diagnosed with one of the following:  -Hypertension, Dyslipidemia, obesity, previous impaired FBS or GTT  Or any two of the following: overweight, FH of diabetes, age ? 72, history of gestational diabetes, birth of baby weighing more than 9 pounds Should be done yearly Up to date   Diabetes Self-Management Training (DSMT) (no USPSTF recommendation) Requires referral by treating physician for patient with diabetes or renal disease.  10 hours of initial DSMT session of no less than 30 minutes each in a continuous 12-month period. 2 hours of follow-up DSMT in subsequent years. Not applicable    Glaucoma Screening (no USPSTF recommendation) Diabetes mellitus, family history, , age 48 or over,  American, age 72 or over Continue with annual eye exams. Overdue per our records   Human Immunodeficiency Virus (HIV) Screening (annually for increased risk patients)  HIV-1 and HIV-2 by EIA, MARGARET, rapid antibody test, or oral mucosa transudate Patient must be at increased risk for HIV infection per USPSTF guidelines or pregnant. Tests covered annually for patients at increased risk. Pregnant patients may receive up to 3 test during pregnancy. Not applicable    Medical Nutrition Therapy (MNT) (for diabetes or renal disease not recommended schedule) Requires referral by treating physician for patient with diabetes or renal disease. Can be provided in same year as diabetes self-management training (DSMT), and CMS recommends medical nutrition therapy take place after DSMT. Up to 3 hours for initial year and 2 hours in subsequent years. Not applicable    Shingles Vaccination A shingles vaccine is also recommended once in a lifetime after age 61 Vaccination recommended for shingles vaccination. Overdue   Seasonal Influenza Vaccination (annually)  Continue with yearly \"flu\" shot annually Overdue   Pneumococcal Vaccination (once after 65)  Please receive this vaccination at age 72. Overdue   Hepatitis B Vaccinations (if medium/high risk) Medium/high risk factors:  End-stage renal disease,  Hemophiliacs who received Factor VIII or IX concentrates, Clients of institutions for the mentally retarded, Persons who live in the same house as a HepB virus carrier, Homosexual men, Illicit injectable drug abusers. Not applicable    Screening Mammography (biennial age 54-69) Annually (age 36 or over) You need a mammogram yearly to screen for breast cancer.  Overdue   Screening Pap Tests and Pelvic Examination (up to age 79 and after 70 if unknown history or abnormal study last 10 years) Every 24 months except high risk You need no Pap smear at this time. Ultrasound Screening for Abdominal Aortic Aneurysm (AAA) (once) Patient must be referred through IPPE and not have had a screening for abdominal aortic aneurysm before under Medicare. Limited to patients who meet one of the following criteria:  - Men who are 73-68 years old and have smoked more than 100 cigarettes in their lifetime.  -Anyone with a FH of AAA  -Anyone recommended for screening by USPSTF Not applicable           High Blood Pressure: Care Instructions  Your Care Instructions    If your blood pressure is usually above 140/90, you have high blood pressure, or hypertension. That means the top number is 140 or higher or the bottom number is 90 or higher, or both. Despite what a lot of people think, high blood pressure usually doesn't cause headaches or make you feel dizzy or lightheaded. It usually has no symptoms. But it does increase your risk for heart attack, stroke, and kidney or eye damage. The higher your blood pressure, the more your risk increases. Your doctor will give you a goal for your blood pressure. Your goal will be based on your health and your age. An example of a goal is to keep your blood pressure below 140/90. Lifestyle changes, such as eating healthy and being active, are always important to help lower blood pressure. You might also take medicine to reach your blood pressure goal.  Follow-up care is a key part of your treatment and safety. Be sure to make and go to all appointments, and call your doctor if you are having problems. It's also a good idea to know your test results and keep a list of the medicines you take. How can you care for yourself at home? Medical treatment  · If you stop taking your medicine, your blood pressure will go back up. You may take one or more types of medicine to lower your blood pressure. Be safe with medicines.  Take your medicine exactly as prescribed. Call your doctor if you think you are having a problem with your medicine. · Talk to your doctor before you start taking aspirin every day. Aspirin can help certain people lower their risk of a heart attack or stroke. But taking aspirin isn't right for everyone, because it can cause serious bleeding. · See your doctor regularly. You may need to see the doctor more often at first or until your blood pressure comes down. · If you are taking blood pressure medicine, talk to your doctor before you take decongestants or anti-inflammatory medicine, such as ibuprofen. Some of these medicines can raise blood pressure. · Learn how to check your blood pressure at home. Lifestyle changes  · Stay at a healthy weight. This is especially important if you put on weight around the waist. Losing even 10 pounds can help you lower your blood pressure. · If your doctor recommends it, get more exercise. Walking is a good choice. Bit by bit, increase the amount you walk every day. Try for at least 30 minutes on most days of the week. You also may want to swim, bike, or do other activities. · Avoid or limit alcohol. Talk to your doctor about whether you can drink any alcohol. · Try to limit how much sodium you eat to less than 2,300 milligrams (mg) a day. Your doctor may ask you to try to eat less than 1,500 mg a day. · Eat plenty of fruits (such as bananas and oranges), vegetables, legumes, whole grains, and low-fat dairy products. · Lower the amount of saturated fat in your diet. Saturated fat is found in animal products such as milk, cheese, and meat. Limiting these foods may help you lose weight and also lower your risk for heart disease. · Do not smoke. Smoking increases your risk for heart attack and stroke. If you need help quitting, talk to your doctor about stop-smoking programs and medicines. These can increase your chances of quitting for good.   When should you call for help?  Call 911 anytime you think you may need emergency care. This may mean having symptoms that suggest that your blood pressure is causing a serious heart or blood vessel problem. Your blood pressure may be over 180/110. ? For example, call 911 if:  ? · You have symptoms of a heart attack. These may include:  ¨ Chest pain or pressure, or a strange feeling in the chest.  ¨ Sweating. ¨ Shortness of breath. ¨ Nausea or vomiting. ¨ Pain, pressure, or a strange feeling in the back, neck, jaw, or upper belly or in one or both shoulders or arms. ¨ Lightheadedness or sudden weakness. ¨ A fast or irregular heartbeat. ? · You have symptoms of a stroke. These may include:  ¨ Sudden numbness, tingling, weakness, or loss of movement in your face, arm, or leg, especially on only one side of your body. ¨ Sudden vision changes. ¨ Sudden trouble speaking. ¨ Sudden confusion or trouble understanding simple statements. ¨ Sudden problems with walking or balance. ¨ A sudden, severe headache that is different from past headaches. ? · You have severe back or belly pain. ?Do not wait until your blood pressure comes down on its own. Get help right away. ?Call your doctor now or seek immediate care if:  ? · Your blood pressure is much higher than normal (such as 180/110 or higher), but you don't have symptoms. ? · You think high blood pressure is causing symptoms, such as:  ¨ Severe headache. ¨ Blurry vision. ? Watch closely for changes in your health, and be sure to contact your doctor if:  ? · Your blood pressure measures 140/90 or higher at least 2 times. That means the top number is 140 or higher or the bottom number is 90 or higher, or both. ? · You think you may be having side effects from your blood pressure medicine. ? · Your blood pressure is usually normal, but it goes above normal at least 2 times. Where can you learn more? Go to http://danny-shonda.info/.   Enter F762 in the search box to learn more about \"High Blood Pressure: Care Instructions. \"  Current as of: September 21, 2016  Content Version: 11.4  © 1584-7221 [employer]. Care instructions adapted under license by [employer] (which disclaims liability or warranty for this information). If you have questions about a medical condition or this instruction, always ask your healthcare professional. [address] any warranty or liability for your use of this information. Medicare Wellness Visit, Female    The best way to live healthy is to have a healthy lifestyle by eating a well-balanced diet, exercising regularly, limiting alcohol and stopping smoking. Regular physical exams and screening tests are another way to keep healthy. Preventive exams provided by your health care provider can find health problems before they become diseases or illnesses. Preventive services including immunizations, screening tests, monitoring and exams can help you take care of your own health. All people over age 72 should have a pneumovax  and and a prevnar shot to prevent pneumonia. These are once in a lifetime unless you and your provider decide differently. All people over 65 should have a yearly flu shot and a tetanus vaccine every 10 years. A bone mass density to screen for osteoporosis or thinning of the bones should be done every 2 years after 65. Screening for diabetes mellitus with a blood sugar test should be done every year. Glaucoma is a disease of the eye due to increased ocular pressure that can lead to blindness and it should be done every year by an eye professional.    Cardiovascular screening tests that check for elevated lipids (fatty part of blood) which can lead to heart disease and strokes should be done every 5 years.     Colorectal screening that evaluates for blood or polyps in your colon should be done yearly as a stool test or every five years as a flexible sigmoidoscope or every 10 years as a colonoscopy up to age 76. Breast cancer screening with a mammogram is recommended biennially  for women age 54-69. Screening for cervical cancer with a pap smear and pelvic exam is recommended for women after age 72 years every 2 years up to age 79 or when the provider and patient decide to stop. If there is a history of cervical abnormalities or other increased risk for cancer then the test is recommended yearly. Hepatitis C screening is also recommended for anyone born between 80 through [address]. A shingles vaccine is also recommended once in a lifetime after age 61. Your Medicare Wellness Exam is recommended annually.     Here is a list of your current Health Maintenance items with a due date:  Health Maintenance Due   Topic Date Due    Annual Well Visit  09/13/2017    Glaucoma Screening   04/11/2018

## 2018-03-12 NOTE — PROGRESS NOTES
[facility] OF [facility]:  03/12/18, [id number]      The Subsequent Medicare Annual Wellness Exam PROGRESS NOTE    This is a Subsequent Medicare Annual Wellness Exam (AWV). I have reviewed the patient's medical history in detail and updated the computerized patient record. Jason Almanza is a 80 y.o.  female and presents for an annual wellness exam.    SUBJECTIVE  The pt has no acute complaints today. Past Medical History:   Diagnosis Date    Breast CA [facility]) [id number]    1984 left, 1986 right    CKD (chronic kidney disease) stage 3, GFR 30-59 ml/min     Glaucoma     TIA (transient ischemic attack) 2009      Past Surgical History:   Procedure Laterality Date    HX CATARACT REMOVAL Left 2006    HX HYSTERECTOMY  1977   Arthea Lower MASTECTOMY  [id number]     Current Outpatient Prescriptions   Medication Sig Dispense Refill    TRAVATAN Z 0.004 % ophthalmic solution PLACE 1 DROP IN RIGHT EYE AT BEDTIME  3    Calcium Carbonate-Vit D3-Min 600 mg calcium- 400 unit tab Take  by mouth every other day.  aspirin 81 mg CpDR Take 81 mg by mouth daily. Patient taking 2 81 mg tablets a day      multivitamin (ONE A DAY) tablet Take 1 Tab by mouth every other day.        Allergies   Allergen Reactions    Alphagan P [Brimonidine] Itching    Codeine Nausea and Vomiting    Darvocet A500 [Propoxyphene N-Acetaminophen] Nausea and Vomiting    Darvon [Propoxyphene] Nausea and Vomiting    Demerol [Meperidine] Nausea and Vomiting    Entex [Phenylephrine-Guaifenesin] Nausea and Vomiting    Erythromycin Nausea and Vomiting    Other Medication Shortness of Breath     No beta blockers    Sulfa (Sulfonamide Antibiotics) Nausea and Vomiting    Timolol Nausea and Vomiting    Vicodin [Hydrocodone-Acetaminophen] Other (comments)     confusion     Family History   Problem Relation Age of Onset    Cancer Mother      breast    Hypertension Mother     Arthritis-osteo Mother     Dementia Mother     Hypertension Father  Heart Disease Father     Arthritis-osteo Father     Hypertension Sister     Diabetes Sister     Cancer Brother      lung    Hypertension Brother     Heart Disease Brother      Social History   Substance Use Topics    Smoking status: Never Smoker    Smokeless tobacco: Never Used    Alcohol use No     Patient Active Problem List   Diagnosis Code    H/O TIA (transient ischemic attack) and stroke Z86.73    Glaucoma H40.9    CKD (chronic kidney disease), stage III N18.3    Cerebral atrophy - seen on head CT G31.9    Urethral polyp - seen on cytoscopy (thought to be the source of her microscopic hematuria) N36.2    Microscopic hematuria R31.29    White coat syndrome without hypertension R03.0     Patient is an 60-year-old female with history of cerebral atrophy per head CT findings, white coat hypertension, urethral polyp seen on cystoscopy, microscopic hematuria, glaucoma, chronic kidney disease stage III, history of TIA. Health Maintenance History  Immunizations reviewed: Tdap over-due, pt declines this vaccination today   Pneumovax: over-due, pt declines this vaccination today   Flu: over-due, pt declines this vaccination today  Zoster: over-due, pt declines this vaccination today    Immunization History   Administered Date(s) Administered    Pneumococcal Polysaccharide (PPSV-23) 03/13/2017     Health Maintenance Due   Topic Date Due    MEDICARE YEARLY EXAM  09/13/2017    GLAUCOMA SCREENING Q2Y  04/11/2018     Colonoscopy: No hematochezia/melena. Overdue per our records. Eye exam: She saw the eye doctor in January of 2018. Her \"eye pressure\" has come down per her hx. Mammo: +H/o mastectomy. Declines imaging studies of her breasts. In remission for breast cancer (diagnosed and treated in the 1980s). Dexascan: Overdue. Pelvic/Pap: No vaginal bleeding. Review of Systems   Constitutional: Negative for chills and fever. HENT: Negative for ear pain and sore throat.     Eyes: Negative for blurred vision and pain. Respiratory: Negative for cough and shortness of breath. Cardiovascular: Negative for chest pain. Gastrointestinal: Negative for abdominal pain, blood in stool and melena. Genitourinary: Negative for dysuria and hematuria. Musculoskeletal: Negative for joint pain and myalgias. Skin: Negative for rash. Neurological: Negative for tingling, focal weakness and headaches. Endo/Heme/Allergies: Does not bruise/bleed easily. Psychiatric/Behavioral: Negative for substance abuse. Depression Risk Factor Screening:      Patient Health Questionnaire (PHQ-2)   Over the last 2 weeks, how often have you been bothered by any of the following problems? · Little interest or pleasure in doing things? · Not at all. [0]  · Feeling down, depressed, or hopeless? · Not at all. [0]    Total Score: 0/6  PHQ-2 Assessment Scoring:   A score of 2 or more requires further screening with the PHQ-9    Alcohol Risk Factor Screening:   Women: On any occasion during the past 3 months, have you had more than 3 drinks containing alcohol? no    Do you average more than 7 drinks per week? no    Tobacco Use Screening:     History   Smoking Status    Never Smoker   Smokeless Tobacco    Never Used       Hearing Loss    Hearing is good. Activities of Daily Living   Self-care. The pt requires no assistance with ADLs/IADLs  Requires assistance with: no ADLs    Fall Risk   No fall risk factors; no falls within the past year. Abuse Screen   None    Additional Examination Findings:  Vitals:    03/12/18 0905 03/12/18 0913   BP: 151/49 142/55   Pulse: 82 79   Resp: 12    Temp: 98.1 °F (36.7 °C)    TempSrc: Oral    SpO2: 98%    Weight: 125 lb 12.8 oz (57.1 kg)    Height: 5' 3\" (1.6 m)    PainSc:   0 - No pain       Body mass index is 22.28 kg/(m^2).      Evaluation of Cognitive Function:  Mood/affect: Euthymic  Appearance: Well-groomed  Family member/caregiver input: The pt is not accompanied by a family member      General:   Well-nourished, well-groomed, pleasant, alert, in no acute distress. Head:  Normocephalic, atraumatic  Ears:  External ears WNL  Eyes:  Clear sclera  Neuro:   Alert, conversant, appropriate, following commands, no sensory deficit   Skin:    No rashes noted  Psych:  Affect, mood and judgment appropriate      Dementia Screen (Mini-Cog): Three Item Recall: 3/3  Clock Drawing (ten past eleven) Exercise: Unremarkable. LABS   Data Review:   Lab Results   Component Value Date/Time    Sodium 145 (H) 04/03/2014 12:00 AM    Potassium 4.8 04/03/2014 12:00 AM    Chloride 103 04/03/2014 12:00 AM    CO2 28 04/03/2014 12:00 AM    Glucose 89 04/03/2014 12:00 AM    BUN 17 04/03/2014 12:00 AM    Creatinine 1.05 (H) 04/03/2014 12:00 AM    BUN/Creatinine ratio 16 04/03/2014 12:00 AM    GFR est AA 59 (L) 04/03/2014 12:00 AM    GFR est non-AA 51 (L) 04/03/2014 12:00 AM    Calcium 9.8 04/03/2014 12:00 AM       Lab Results   Component Value Date/Time    WBC 4.5 04/03/2014 12:00 AM    HGB 15.3 04/03/2014 12:00 AM    HCT 47.3 (H) 04/03/2014 12:00 AM    PLATELET 370 91/12/6408 12:00 AM    MCV 95 04/03/2014 12:00 AM     Lab Results   Component Value Date/Time    Cholesterol, total 229 (H) 04/03/2014 12:00 AM    HDL Cholesterol 62 04/03/2014 12:00 AM    LDL, calculated 140 (H) 04/03/2014 12:00 AM    VLDL, calculated 27 04/03/2014 12:00 AM    Triglyceride 135 04/03/2014 12:00 AM     Patient Care Team:  Marly Foster MD as PCP - General (Family Practice)  Doe Martinez MD as Physician (Urology)    End-of-life planning  Advanced Directive in the case than an injury or illness causes the patient to be unable to make health care decisions was discussed with the patient.      Advice/Referrals/Counselling/Plan:   Education and counseling provided:  Are appropriate based on today's review and evaluation  End-of-Life planning (with patient's consent)  Pneumococcal Vaccine  Influenza Vaccine  Screening Mammography  Screening Pap and pelvic (covered once every 2 years)  Colorectal cancer screening tests  Cardiovascular screening blood test  Bone mass measurement (DEXA)  Screening for glaucoma  Diabetes screening test  Include in education list (weight loss, physical activity, smoking cessation, fall prevention, and nutrition)    ICD-10-CM ICD-9-CM    1. CKD (chronic kidney disease), stage III I82.9 104.5 METABOLIC PANEL, BASIC   2. Glaucoma, unspecified glaucoma type, unspecified laterality H40.9 365.9    3. Screening for hyperlipidemia Z13.220 V77.91 LIPID PANEL     reviewed diet, exercise and weight control. Brief written plan, checklist    Assessment/Plan:    Health maintenance:  The patient declines colon cancer screening. She also declines getting osteoporosis screening. She declines all vaccinations. The patient is amenable to getting her cholesterol checked. Given her chronic kidney disease stage III (likely age-related), the patient agreed to getting lab work right before her follow-up appointment to assess her renal function and to screen for hyperlipidemia.  - Requesting copies of her last formal eye exam.      Lab review: labs are reviewed in the [address] [place] [address] (ACP) Provider Conversation     Date of ACP Conversation: 03/12/18  Persons included in Conversation:  patient    Authorized Decision Maker (if patient is incapable of making informed decisions): This person is:   Healthcare Agent/Medical Power of  under Advance Directive          For Patients with Decision Making Capacity:   Values/Goals: Exploration of values, goals, and preferences if recovery is not expected, even with continued medical treatment in the event of:  Imminent death  Severe, permanent brain injury    Conversation Outcomes / Follow-Up Plan:   Recommended completion of Advance Directive form after review of ACP materials and conversation with prospective healthcare agent . The patient has no changes to make to her pre-existing advanced directive. I have discussed the diagnosis with the patient and the intended plan as seen in the above orders. The patient has received an after-visit summary and questions were answered concerning future plans. I have discussed medication side effects and warnings with the patient as well. I have reviewed the plan of care with the patient, accepted their input and they are in agreement with the treatment goals. Follow-up Disposition:  Return in about 7 months (around 10/12/2018).

## 2018-03-30 NOTE — MR AVS SNAPSHOT
Visit Information Date & Time Provider Department Dept. Phone Encounter #  
 9/11/2017  9:30 AM Rajni Malave MD Internist of [address] [id number] Follow-up Instructions Return in about 6 months (around 3/11/2018) for BP check. Upcoming Health Maintenance Date Due  
 MEDICARE YEARLY EXAM 9/13/2017 Pneumococcal 65+ Low/Medium Risk (2 of 2 - PCV13) 3/13/2018 GLAUCOMA SCREENING Q2Y 4/11/2018 DTaP/Tdap/Td series (2 - Td) 9/11/2027 Allergies as of 9/11/2017  Review Complete On: 9/11/2017 By: Rajni Malave MD  
  
 Severity Noted Reaction Type Reactions Alphagan P [Brimonidine] High 09/14/2015    Itching Codeine  10/01/2012    Nausea and Vomiting Darvocet A500 [Propoxyphene N-acetaminophen]  10/01/2012    Nausea and Vomiting Darvon [Propoxyphene]  10/01/2012    Nausea and Vomiting Demerol [Meperidine]  10/01/2012    Nausea and Vomiting Entex [Phenylephrine-guaifenesin]  10/01/2012    Nausea and Vomiting Erythromycin  10/01/2012    Nausea and Vomiting Other Medication  10/01/2012    Shortness of Breath No beta blockers Sulfa (Sulfonamide Antibiotics)  10/01/2012    Nausea and Vomiting Timolol  10/01/2012    Nausea and Vomiting Vicodin [Hydrocodone-acetaminophen]  10/01/2012    Other (comments)  
 confusion Current Immunizations  Never Reviewed Name Date Pneumococcal Polysaccharide (PPSV-23) 3/13/2017 Not reviewed this visit You Were Diagnosed With   
  
 Codes Comments Microscopic hematuria    -  Primary ICD-10-CM: R31.29 ICD-9-CM: 599.72 CKD (chronic kidney disease), stage III     ICD-10-CM: N18.3 ICD-9-CM: 882. 3 Screening for hyperlipidemia     ICD-10-CM: Z13.220 ICD-9-CM: V77.91 Vitals BP Pulse Temp Resp Height(growth percentile) Weight(growth percentile)  136/50 (BP 1 Location: Left arm, BP Patient Position: Sitting) 75 97.8 °F (36.6 °C) (Oral) 12 5' 3\" (1.6 m) 123 lb 9.6 oz (56.1 kg) SpO2 BMI OB Status Smoking Status 98% 21.89 kg/m2 Hysterectomy Never Smoker Vitals History BMI and BSA Data Body Mass Index Body Surface Area  
 21.89 kg/m 2 1.58 m 2 Preferred Pharmacy Pharmacy Name Phone [facility][id number]- Stephen Santos [phone] Your Updated Medication List  
  
   
This list is accurate as of: 9/11/17  9:58 AM.  Always use your most recent med list.  
  
  
  
  
 aspirin 81 mg Cpdr  
Take 81 mg by mouth daily. Patient taking 2 81 mg tablets a day Calcium Carbonate-Vit D3-Min 600 mg calcium- 400 unit Tab Take  by mouth every other day. multivitamin tablet Commonly known as:  ONE A DAY Take 1 Tab by mouth every other day. TRAVATAN Z 0.004 % ophthalmic solution Generic drug:  travoprost  
PLACE 1 DROP IN RIGHT EYE AT BEDTIME Follow-up Instructions Return in about 6 months (around 3/11/2018) for BP check. To-Do List   
 09/11/2017 Lab:  CBC WITH AUTOMATED DIFF   
  
 09/11/2017 Lab:  LIPID PANEL   
  
 09/11/2017 Lab:  METABOLIC PANEL, COMPREHENSIVE Patient Instructions Health Maintenance Due Topic Date Due  
 DTaP/Tdap/Td series (1 - Tdap) 05/13/1956 Body Mass Index: Care Instructions Your Care Instructions Body mass index (BMI) can help you see if your weight is raising your risk for health problems. It uses a formula to compare how much you weigh with how tall you are. · A BMI lower than 18.5 is considered underweight. · A BMI between 18.5 and 24.9 is considered healthy. · A BMI between 25 and 29.9 is considered overweight. A BMI of 30 or higher is considered obese. If your BMI is in the normal range, it means that you have a lower risk for weight-related health problems.  If your BMI is in the overweight or obese range, you may be at increased risk for weight-related health problems, such as high blood pressure, heart disease, stroke, arthritis or joint pain, and diabetes. If your BMI is in the underweight range, you may be at increased risk for health problems such as fatigue, lower protection (immunity) against illness, muscle loss, bone loss, hair loss, and hormone problems. BMI is just one measure of your risk for weight-related health problems. You may be at higher risk for health problems if you are not active, you eat an unhealthy diet, or you drink too much alcohol or use tobacco products. Follow-up care is a key part of your treatment and safety. Be sure to make and go to all appointments, and call your doctor if you are having problems. It's also a good idea to know your test results and keep a list of the medicines you take. How can you care for yourself at home? · Practice healthy eating habits. This includes eating plenty of fruits, vegetables, whole grains, lean protein, and low-fat dairy. · If your doctor recommends it, get more exercise. Walking is a good choice. Bit by bit, increase the amount you walk every day. Try for at least 30 minutes on most days of the week. · Do not smoke. Smoking can increase your risk for health problems. If you need help quitting, talk to your doctor about stop-smoking programs and medicines. These can increase your chances of quitting for good. · Limit alcohol to 2 drinks a day for men and 1 drink a day for women. Too much alcohol can cause health problems. If you have a BMI higher than 25 · Your doctor may do other tests to check your risk for weight-related health problems. This may include measuring the distance around your waist. A waist measurement of more than 40 inches in men or 35 inches in women can increase the risk of weight-related health problems.  
· Talk with your doctor about steps you can take to stay healthy or improve your health. You may need to make lifestyle changes to lose weight and stay healthy, such as changing your diet and getting regular exercise. If you have a BMI lower than 18.5 · Your doctor may do other tests to check your risk for health problems. · Talk with your doctor about steps you can take to stay healthy or improve your health. You may need to make lifestyle changes to gain or maintain weight and stay healthy, such as getting more healthy foods in your diet and doing exercises to build muscle. Where can you learn more? Go to http://danny-shonda.info/. Enter S176 in the search box to learn more about \"Body Mass Index: Care Instructions. \" Current as of: January 23, 2017 Content Version: 11.3 © 2524-9264 [employer]. Care instructions adapted under license by [employer] (which disclaims liability or warranty for this information). If you have questions about a medical condition or this instruction, always ask your healthcare professional. [place] [address] any warranty or liability for your use of this information. Introducing [facility]! Julieta Galeano introduces Chat& (ChatAnd) patient portal. Now you can access parts of your medical record, email your doctor's office, and request medication refills online. 1. In your internet browser, go to [email or web address] 2. Click on the First Time User? Click Here link in the Sign In box. You will see the New Member Sign Up page. 3. Enter your Chat& (ChatAnd) Access Code exactly as it appears below. You will not need to use this code after youve completed the sign-up process. If you do not sign up before the expiration date, you must request a new code. · Chat& (ChatAnd) Access Code: [email or web address] Expires: 12/10/2017  9:55 AM 
 
4. Enter the last four digits of your Social Security Number (xxxx) and Date of Birth (mm/dd/yyyy) as indicated and click Submit.  You will be taken to the next sign-up page. 5. Create a [employer] ID. This will be your [employer] login ID and cannot be changed, so think of one that is secure and easy to remember. 6. Create a [employer] password. You can change your password at any time. 7. Enter your Password Reset Question and Answer. This can be used at a later time if you forget your password. 8. Enter your e-mail address. You will receive e-mail notification when new information is available in [address] 9. Click Sign Up. You can now view and download portions of your medical record. 10. Click the Download Summary menu link to download a portable copy of your medical information. If you have questions, please visit the Frequently Asked Questions section of the [employer] website. Remember, [employer] is NOT to be used for urgent needs. For medical emergencies, dial 911. Now available from your iPhone and Android! Please provide this summary of care documentation to your next provider. Your primary care clinician is listed as Kang Pan. If you have any questions after today's visit, please call [phone]. Attending Only

## 2018-08-30 LAB
BUN SERPL-MCNC: 20 MG/DL (ref 7–25)
BUN/CREATININE RATIO,BUCR: 18 (CALC) (ref 6–22)
CALCIUM SERPL-MCNC: 9.6 MG/DL (ref 8.6–10.4)
CHLORIDE SERPL-SCNC: 104 MMOL/L (ref 98–110)
CHOL/HDL RATIO,CHHDX: 3.4 (CALC)
CHOLEST SERPL-MCNC: 197 MG/DL
CO2 SERPL-SCNC: 30 MMOL/L (ref 20–32)
CREAT SERPL-MCNC: 1.14 MG/DL (ref 0.6–0.88)
GLUCOSE SERPL-MCNC: 94 MG/DL (ref 65–99)
HDLC SERPL-MCNC: 58 MG/DL
LDL-CHOLESTEROL: 116 MG/DL (CALC)
NON-HDL CHOLESTEROL, 011976: 139 MG/DL (CALC)
POTASSIUM SERPL-SCNC: 4.9 MMOL/L (ref 3.5–5.3)
SODIUM SERPL-SCNC: 141 MMOL/L (ref 135–146)
TRIGL SERPL-MCNC: 120 MG/DL (ref ?–150)

## 2018-09-10 NOTE — PROGRESS NOTES
I will discuss her results with her tomorrow at her f/u apt. Her cholesterol is 197. Her HDL is 58. Her LDL is 116. Her renal function is about the same as it was 5 yrs ago. Her BUN is elevated at 20. Her creatinine is up to 1.14 from 1.05. She needs to drink more water.      Dr. Diego Le  [facility] of [address] [place] [address], [address] [place] [address], [address] [place] [address] Str.  Phone: [phone]  Fax: [phone]

## 2018-09-11 ENCOUNTER — OFFICE VISIT (OUTPATIENT)
Dept: INTERNAL MEDICINE CLINIC | Age: 83
End: 2018-09-11

## 2018-09-11 VITALS
BODY MASS INDEX: 22.29 KG/M2 | WEIGHT: 125.8 LBS | HEIGHT: 63 IN | RESPIRATION RATE: 12 BRPM | OXYGEN SATURATION: 97 % | TEMPERATURE: 97.7 F | SYSTOLIC BLOOD PRESSURE: 160 MMHG | DIASTOLIC BLOOD PRESSURE: 59 MMHG | HEART RATE: 77 BPM

## 2018-09-11 DIAGNOSIS — Z85.3 HISTORY OF BREAST CANCER: ICD-10-CM

## 2018-09-11 DIAGNOSIS — Z13.0 SCREENING FOR DEFICIENCY ANEMIA: ICD-10-CM

## 2018-09-11 DIAGNOSIS — H40.9 GLAUCOMA, UNSPECIFIED GLAUCOMA TYPE, UNSPECIFIED LATERALITY: ICD-10-CM

## 2018-09-11 DIAGNOSIS — R03.0 WHITE COAT SYNDROME WITHOUT HYPERTENSION: ICD-10-CM

## 2018-09-11 DIAGNOSIS — N18.30 CKD (CHRONIC KIDNEY DISEASE), STAGE III (HCC): ICD-10-CM

## 2018-09-11 DIAGNOSIS — E78.2 MIXED HYPERLIPIDEMIA: Primary | ICD-10-CM

## 2018-09-11 RX ORDER — PRAVASTATIN SODIUM 10 MG/1
10 TABLET ORAL
Qty: 30 TAB | Refills: 11 | Status: SHIPPED | OUTPATIENT
Start: 2018-09-11 | End: 2019-09-11 | Stop reason: SINTOL

## 2018-09-11 RX ORDER — NETARSUDIL 0.2 MG/ML
SOLUTION/ DROPS OPHTHALMIC; TOPICAL
Refills: 6 | COMMUNITY
Start: 2018-07-20

## 2018-09-11 NOTE — MR AVS SNAPSHOT
[facility] 
 
 
 [address] 
[phone] Patient: Gonsalo Hood MRN: [id number] XMP:[id number]/95/4730 Visit Information Date & Time Provider Department Dept. Phone Encounter #  
 9/11/2018  9:00 AM Vani Carrillo MD Internists of [address] [place] [address] [phone] Follow-up Instructions Return in about 1 year (around 9/11/2019). Upcoming Health Maintenance Date Due Influenza Age 5 to Adult 8/1/2018 GLAUCOMA SCREENING Q2Y 1/17/2020 DTaP/Tdap/Td series (2 - Td) 3/12/2028 Allergies as of 9/11/2018  Review Complete On: 9/11/2018 By: Vani Carrillo MD  
  
 Severity Noted Reaction Type Reactions Alphagan P [Brimonidine] High 09/14/2015    Itching Codeine  10/01/2012    Nausea and Vomiting Darvocet A500 [Propoxyphene N-acetaminophen]  10/01/2012    Nausea and Vomiting Darvon [Propoxyphene]  10/01/2012    Nausea and Vomiting Demerol [Meperidine]  10/01/2012    Nausea and Vomiting Entex [Phenylephrine-guaifenesin]  10/01/2012    Nausea and Vomiting Erythromycin  10/01/2012    Nausea and Vomiting Other Medication  10/01/2012    Shortness of Breath No beta blockers Sulfa (Sulfonamide Antibiotics)  10/01/2012    Nausea and Vomiting Timolol  10/01/2012    Nausea and Vomiting Vicodin [Hydrocodone-acetaminophen]  10/01/2012    Other (comments)  
 confusion Current Immunizations  Never Reviewed Name Date Pneumococcal Polysaccharide (PPSV-23) 3/13/2017 Not reviewed this visit You Were Diagnosed With   
  
 Codes Comments Mixed hyperlipidemia    -  Primary ICD-10-CM: Y45.7 ICD-9-CM: 272.2 White coat syndrome without hypertension     ICD-10-CM: R03.0 ICD-9-CM: 796.2 Screening for deficiency anemia     ICD-10-CM: Z13.0 ICD-9-CM: V78.1 Glaucoma, unspecified glaucoma type, unspecified laterality     ICD-10-CM: H40.9 ICD-9-CM: 365.9 CKD (chronic kidney disease), stage III     ICD-10-CM: N18.3 ICD-9-CM: 585.3 History of breast cancer     ICD-10-CM: Z85.3 ICD-9-CM: V10.3 Vitals BP Pulse Temp Resp Height(growth percentile) Weight(growth percentile) 160/59 (BP 1 Location: Right arm, BP Patient Position: Sitting) 77 97.7 °F (36.5 °C) (Oral) 12 5' 3\" (1.6 m) 125 lb 12.8 oz (57.1 kg) SpO2 BMI OB Status Smoking Status 97% 22.28 kg/m2 Hysterectomy Never Smoker Vitals History BMI and BSA Data Body Mass Index Body Surface Area  
 22.28 kg/m 2 1.59 m 2 Preferred Pharmacy Pharmacy Name Phone [facility]/PHARMACY #[id number]- [facility] Locke  [phone] Your Updated Medication List  
  
   
This list is accurate as of 9/11/18  9:57 AM.  Always use your most recent med list.  
  
  
  
  
 aspirin 81 mg Cpdr  
Take 81 mg by mouth daily. Patient taking 2 81 mg tablets a day Calcium Carbonate-Vit D3-Min 600 mg calcium- 400 unit Tab Take  by mouth every other day. multivitamin tablet Commonly known as:  ONE A DAY Take 1 Tab by mouth every other day. pravastatin 10 mg tablet Commonly known as:  PRAVACHOL Take 1 Tab by mouth nightly. RHOPRESSA 0.02 % Drop Generic drug:  netarsudil INSTILL 1 DROP IN RIGHT EYE DAILY DINNER  
  
 SYSTANE LIQUID GEL OP Apply  to eye daily as needed. TRAVATAN Z 0.004 % ophthalmic solution Generic drug:  travoprost  
PLACE 1 DROP IN RIGHT EYE AT BEDTIME Prescriptions Sent to Pharmacy Refills  
 pravastatin (PRAVACHOL) 10 mg tablet 11 Sig: Take 1 Tab by mouth nightly. Class: Normal  
 Pharmacy: [address] [place] [address] #: [phone] Route: Oral  
  
Follow-up Instructions Return in about 1 year (around 9/11/2019). To-Do List   
 08/11/2019 Lab:  CBC WITH AUTOMATED DIFF   
  
 08/11/2019 Lab:  LIPID PANEL   
  
 08/11/2019 Lab: METABOLIC PANEL, COMPREHENSIVE Patient Instructions Health Maintenance Due Topic Date Due  Influenza Age 5 to Adult  08/01/2018 Introducing [facility]! Trey Reddy introduces [employer] patient portal. Now you can access parts of your medical record, email your doctor's office, and request medication refills online. 1. In your internet browser, go to [email or web address] 2. Click on the First Time User? Click Here link in the Sign In box. You will see the New Member Sign Up page. 3. Enter your [employer] Access Code exactly as it appears below. You will not need to use this code after youve completed the sign-up process. If you do not sign up before the expiration date, you must request a new code. · [employer] Access Code: [email or web address] Expires: 12/10/2018  8:44 AM 
 
4. Enter the last four digits of your Social Security Number (xxxx) and Date of Birth (mm/dd/yyyy) as indicated and click Submit. You will be taken to the next sign-up page. 5. Create a [employer] ID. This will be your [employer] login ID and cannot be changed, so think of one that is secure and easy to remember. 6. Create a [employer] password. You can change your password at any time. 7. Enter your Password Reset Question and Answer. This can be used at a later time if you forget your password. 8. Enter your e-mail address. You will receive e-mail notification when new information is available in [address] 9. Click Sign Up. You can now view and download portions of your medical record. 10. Click the Download Summary menu link to download a portable copy of your medical information. If you have questions, please visit the Frequently Asked Questions section of the [employer] website. Remember, [employer] is NOT to be used for urgent needs. For medical emergencies, dial 911. Now available from your iPhone and Android! Please provide this summary of care documentation to your next provider. Your primary care clinician is listed as Meghan Acosta. If you have any questions after today's visit, please call [phone].

## 2018-09-11 NOTE — PROGRESS NOTES
Chief Complaint   Patient presents with    Hypertension     follow up    Labs     done 8-29-18     1. Have you been to the ER, urgent care clinic since your last visit? Hospitalized since your last visit? No    2. Have you seen or consulted any other health care providers outside of the [address] [place] [address] since your last visit? Include any pap smears or colon screening.  No

## 2018-09-11 NOTE — PROGRESS NOTES
INTERNISTS OF [facility]:  9/11/2018, MRN: [id number]      Param Rand is a 80 y.o. female and presents to clinic for Hypertension (follow up) and Labs (done 8-29-18)    Subjective:   Patient is an 51-year-old female with history of cerebral atrophy per head CT findings, white coat hypertension, urethral polyp seen on cystoscopy, microscopic hematuria, h/o breast cancer (declining further mammograms), glaucoma, chronic kidney disease stage III, history of TIA. 1. Temporary Elevated Blood Pressure: At her last appointment, she had an elevated BP. Today her blood pressure is 153/44. She has a h/o white coat hypertension and has not been checking her BP at home. Her most recent labs show evidence of chronic kidney disease thought to be secondary to age-related renal decline. Her creatinine is 1.14, up from 1.05 when last checked. Her BUN is elevated at 20. This suggests some dehydration. She admits not drinking a lot of water. 2. HLD: Her most recent labs show a total cholesterol of 197. HDL is 58. Her LDL is 116. Per EHR, she has a history of TIA. No new neurologic symptoms. She is asymptomatic today. Her weight is 125 pounds. 3.  History of Breast Cancer: The patient continues to adamantly decline breast cancer surveillance and mammography. She declines all cancer screenings including colon cancer screening. 4.  General:  -She declines her flu vaccine today.  -She is followed regularly by her ophthalmology team given history of glaucoma for over a year. Her last appointment with them was within the past month. Per her history, her eyedrops are working well to lower her intraocular pressures. Per her history, her pressures have dropped 2 units since her last formal eye exam earlier this year.       Patient Active Problem List    Diagnosis Date Noted    History of breast cancer 03/12/2018    White coat syndrome without hypertension 09/11/2017    Cerebral atrophy - seen on head CT 12/27/2016  Urethral polyp - seen on cytoscopy (thought to be the source of her microscopic hematuria) 12/27/2016    Microscopic hematuria 12/27/2016    Glaucoma 07/16/2013    CKD (chronic kidney disease), stage III 07/16/2013    H/O TIA (transient ischemic attack) and stroke 06/05/2013       Current Outpatient Prescriptions   Medication Sig Dispense Refill    RHOPRESSA 0.02 % drop INSTILL 1 DROP IN RIGHT EYE DAILY DINNER  6    propylene glycol/peg 400 (SYSTANE LIQUID GEL OP) Apply  to eye daily as needed.  pravastatin (PRAVACHOL) 10 mg tablet Take 1 Tab by mouth nightly. 30 Tab 11    TRAVATAN Z 0.004 % ophthalmic solution PLACE 1 DROP IN RIGHT EYE AT BEDTIME  3    Calcium Carbonate-Vit D3-Min 600 mg calcium- 400 unit tab Take  by mouth every other day.  aspirin 81 mg CpDR Take 81 mg by mouth daily. Patient taking 2 81 mg tablets a day      multivitamin (ONE A DAY) tablet Take 1 Tab by mouth every other day.          Allergies   Allergen Reactions    Alphagan P [Brimonidine] Itching    Codeine Nausea and Vomiting    Darvocet A500 [Propoxyphene N-Acetaminophen] Nausea and Vomiting    Darvon [Propoxyphene] Nausea and Vomiting    Demerol [Meperidine] Nausea and Vomiting    Entex [Phenylephrine-Guaifenesin] Nausea and Vomiting    Erythromycin Nausea and Vomiting    Other Medication Shortness of Breath     No beta blockers    Sulfa (Sulfonamide Antibiotics) Nausea and Vomiting    Timolol Nausea and Vomiting    Vicodin [Hydrocodone-Acetaminophen] Other (comments)     confusion       Past Medical History:   Diagnosis Date    Breast CA [facility]) 9089/[id number]    1984 left, 1986 right    CKD (chronic kidney disease) stage 3, GFR 30-59 ml/min     Glaucoma     TIA (transient ischemic attack) 2009       Past Surgical History:   Procedure Laterality Date    HX CATARACT REMOVAL Left 2006    HX HYSTERECTOMY  1977   Leung Ruder MASTECTOMY  [phone]       Family History   Problem Relation Age of Onset    Cancer Mother breast    Hypertension Mother     Arthritis-osteo Mother     Dementia Mother     Hypertension Father     Heart Disease Father     Arthritis-osteo Father     Hypertension Sister     Diabetes Sister     Cancer Brother      lung    Hypertension Brother     Heart Disease Brother        Social History   Substance Use Topics    Smoking status: Never Smoker    Smokeless tobacco: Never Used    Alcohol use No       ROS   Review of Systems   Constitutional: Negative for chills and fever. HENT: Negative for ear pain and sore throat. Eyes: Negative for blurred vision and pain. Respiratory: Negative for cough and shortness of breath. Cardiovascular: Negative for chest pain. Gastrointestinal: Negative for abdominal pain, blood in stool and melena. Genitourinary: Negative for dysuria and hematuria. Musculoskeletal: Negative for joint pain and myalgias. Skin: Negative for rash. Neurological: Negative for tingling, focal weakness and headaches. Endo/Heme/Allergies: Does not bruise/bleed easily. Psychiatric/Behavioral: Negative for substance abuse. Objective     Vitals:    09/11/18 0906   BP: 153/44   Pulse: 75   Resp: 12   Temp: 97.7 °F (36.5 °C)   TempSrc: Oral   SpO2: 97%   Weight: 125 lb 12.8 oz (57.1 kg)   Height: 5' 3\" (1.6 m)   PainSc:   0 - No pain       Physical Exam   Constitutional: She is oriented to person, place, and time and well-developed, well-nourished, and in no distress. HENT:   Head: Normocephalic and atraumatic. Right Ear: External ear normal.   Left Ear: External ear normal.   Nose: Nose normal.   Mouth/Throat: Oropharynx is clear and moist. No oropharyngeal exudate. Eyes: Conjunctivae and EOM are normal. Right eye exhibits no discharge. Left eye exhibits no discharge. No scleral icterus. Neck: Neck supple. Cardiovascular: Normal rate, regular rhythm, normal heart sounds and intact distal pulses. Exam reveals no gallop and no friction rub.     No murmur heard.  Pulmonary/Chest: Effort normal and breath sounds normal. No respiratory distress. She has no wheezes. She has no rales. Abdominal: Soft. Bowel sounds are normal. She exhibits no distension. Musculoskeletal: She exhibits no edema or tenderness (BUE). Lymphadenopathy:     She has no cervical adenopathy. Neurological: She is alert and oriented to person, place, and time. She exhibits normal muscle tone. Gait normal.   Skin: Skin is warm and dry. No erythema. Psychiatric: Affect normal.   Nursing note and vitals reviewed. LABS   Data Review:   Lab Results   Component Value Date/Time    WBC 4.5 04/03/2014 12:00 AM    HGB 15.3 04/03/2014 12:00 AM    HCT 47.3 (H) 04/03/2014 12:00 AM    PLATELET 372 78/51/0831 12:00 AM    MCV 95 04/03/2014 12:00 AM       Lab Results   Component Value Date/Time    Sodium 141 08/29/2018 08:04 AM    Potassium 4.9 08/29/2018 08:04 AM    Chloride 104 08/29/2018 08:04 AM    CO2 30 08/29/2018 08:04 AM    Glucose 94 08/29/2018 08:04 AM    BUN 20 08/29/2018 08:04 AM    Creatinine 1.14 (H) 08/29/2018 08:04 AM    BUN/Creatinine ratio 18 08/29/2018 08:04 AM    GFR est AA 51 (L) 08/29/2018 08:04 AM    GFR est non-AA 44 (L) 08/29/2018 08:04 AM    Calcium 9.6 08/29/2018 08:04 AM       Lab Results   Component Value Date/Time    Cholesterol, total 197 08/29/2018 08:04 AM    HDL Cholesterol 58 08/29/2018 08:04 AM    LDL-CHOLESTEROL 116 (H) 08/29/2018 08:04 AM    LDL, calculated 140 (H) 04/03/2014 12:00 AM    VLDL, calculated 27 04/03/2014 12:00 AM    Triglyceride 120 08/29/2018 08:04 AM    Cholesterol/HDL ratio 3.4 08/29/2018 08:04 AM       No results found for: HBA1C, HGBE8, PRO9OXDB, MHF5ZPNB, IAL7NELD    Assessment/Plan:   1. White coat syndrome without hypertension:  +CKD (thought to be age related decline in creatinine). Her recent elevated BUN though suggests a mild dehydration.  -I encouraged her to continue monitoring her blood pressure.   I encouraged her to check it at home and to notify me if is persistently running higher than 140/90. She was instructed to keep a blood pressure log for 2 weeks, checking her blood pressure once a day. If all pressures are greater than 140/90, I instructed her to return to clinic.  -I encouraged her to drink eight 8 ounce cups of water per day. - Checking a CMP just before her f/u apt.    2. Mixed hyperlipidemia: LDL is 116. She has a h/o TIA per EHR. -Ordering pravastatin 10 mg daily. We discussed the potential side effects of this medication. All questions were answered.  -I will check a lipid panel and a CMP just before her follow-up appointment. ORDERS:  - pravastatin (PRAVACHOL) 10 mg tablet; Take 1 Tab by mouth nightly. Dispense: 30 Tab; Refill: 11  - LIPID PANEL; Future    3. Health Maintenance:  -Ordering a CBC to screen for anemia  Just before her follow-up appointment.  -I encouraged her to continue following up with her ophthalmology team for serial eye exams and pressure checks given her history of glaucoma. Continue with Rx per their recommendations.  -I encouraged the patient to notify me if she changes her mind on getting any of the cancer screenings or vaccinations were discussed today. She declines all cancer screenings and getting the flu shot today. ORDERS:  - CBC WITH AUTOMATED DIFF; Future    4. History of breast cancer: Pt declines further cancer screenings including mammograms.  -Checking a CMP and a CBC just before her follow-up appointment. ORDERS:  - METABOLIC PANEL, COMPREHENSIVE; Future  - CBC WITH AUTOMATED DIFF; Future        Health Maintenance Due   Topic Date Due    Influenza Age 5 to Adult  08/01/2018     Lab review: labs are reviewed in the EHR and ordered as mentioned above    I have discussed the diagnosis with the patient and the intended plan as seen in the above orders. The patient has received an after-visit summary and questions were answered concerning future plans.   I have discussed medication side effects and warnings with the patient as well. I have reviewed the plan of care with the patient, accepted their input and they are in agreement with the treatment goals. All questions were answered. The patient understands the plan of care. Handouts provided today with above information. Pt instructed if symptoms worsen to call the office or report to the ED for continued care. Greater than 50% of the visit time was spent in counseling and/or coordination of care. Voice recognition was used to generate this report, which may have resulted in some phonetic based errors in grammar and contents. Even though attempts were made to correct all the mistakes, some may have been missed, and remained in the body of the document. Follow-up Disposition:  Return in about 1 year (around 9/11/2019).     Lakisha Nicholson MD

## 2018-09-16 ENCOUNTER — TELEPHONE (OUTPATIENT)
Dept: INTERNAL MEDICINE CLINIC | Age: 83
End: 2018-09-16

## 2018-09-24 DIAGNOSIS — E78.2 MIXED HYPERLIPIDEMIA: ICD-10-CM

## 2018-09-25 ENCOUNTER — TELEPHONE (OUTPATIENT)
Dept: INTERNAL MEDICINE CLINIC | Age: 83
End: 2018-09-25

## 2018-09-25 NOTE — TELEPHONE ENCOUNTER
Patient stating she was having muscle pain and tenderness, swelling of the throat and tongue, trouble swallowing. She stopped the Pravastatin and the symptoms have slowly improved. She is only having muscle pain and tenderness in her legs now. Stating she does not want another medication. She would rather manage her cholesterol with her diet.

## 2019-03-22 ENCOUNTER — HOSPITAL ENCOUNTER (OUTPATIENT)
Dept: VASCULAR SURGERY | Age: 84
Discharge: HOME | End: 2019-03-22
Attending: OPHTHALMOLOGY
Payer: MEDICARE

## 2019-03-22 DIAGNOSIS — G45.3 AMAUROSIS FUGAX OF LEFT EYE: ICD-10-CM

## 2019-03-22 PROCEDURE — 93880 EXTRACRANIAL BILAT STUDY: CPT

## 2019-03-23 LAB
LEFT CCA DIST DIAS: 8.1 CM/S
LEFT CCA DIST SYS: 94.5 CM/S
LEFT CCA MID DIAS: 10.87 CM/S
LEFT CCA MID SYS: 111.22 CM/S
LEFT CCA PROX DIAS: 19.2 CM/S
LEFT CCA PROX SYS: 144.7 CM/S
LEFT ECA DIAS: 8.08 CM/S
LEFT ECA SYS: 91.7 CM/S
LEFT ICA DIST DIAS: 12.8 CM/S
LEFT ICA DIST SYS: 87.1 CM/S
LEFT ICA MID DIAS: 12.8 CM/S
LEFT ICA MID SYS: 79 CM/S
LEFT ICA PROX DIAS: 7.9 CM/S
LEFT ICA PROX SYS: 72.5 CM/S
LEFT ICA/CCA SYS: 0.92
LEFT SUBCLAVIAN SYS: 105.4 CM/S
LEFT VERTEBRAL DIAS: 9.53 CM/S
LEFT VERTEBRAL SYS: 48.3 CM/S
RIGHT CCA DIST DIAS: 11.2 CM/S
RIGHT CCA DIST SYS: 72.5 CM/S
RIGHT CCA MID DIAS: 7.91 CM/S
RIGHT CCA MID SYS: 99.98 CM/S
RIGHT CCA PROX DIAS: 6.1 CM/S
RIGHT CCA PROX SYS: 83 CM/S
RIGHT ECA DIAS: 4.19 CM/S
RIGHT ECA SYS: 80.7 CM/S
RIGHT ICA DIST DIAS: 14.4 CM/S
RIGHT ICA DIST SYS: 71.1 CM/S
RIGHT ICA MID DIAS: 14.4 CM/S
RIGHT ICA MID SYS: 74.7 CM/S
RIGHT ICA PROX DIAS: 12.8 CM/S
RIGHT ICA PROX SYS: 64.5 CM/S
RIGHT ICA/CCA SYS: 1
RIGHT SUBCLAVIAN SYS: 125.8 CM/S
RIGHT VERTEBRAL DIAS: 6.5 CM/S
RIGHT VERTEBRAL SYS: 37.8 CM/S

## 2019-08-12 ENCOUNTER — DOCUMENTATION ONLY (OUTPATIENT)
Dept: INTERNAL MEDICINE CLINIC | Age: 84
End: 2019-08-12

## 2019-08-21 LAB
ABSOLUTE BANDS, 67058: ABNORMAL
ABSOLUTE BLASTS: ABNORMAL
ABSOLUTE METAMYELOCYTES, 900360: ABNORMAL
ABSOLUTE MYELOCYTES: ABNORMAL
ABSOLUTE NRBC,ANRBC: ABNORMAL
ABSOLUTE PROMYELOCYTES: ABNORMAL
ALB/GLOBRATIO, 58C: 1.8 (CALC) (ref 1–2.5)
ALBUMIN SERPL-MCNC: 4.4 G/DL (ref 3.6–5.1)
ALP SERPL-CCNC: 66 U/L (ref 33–130)
ALT SERPL-CCNC: 11 U/L (ref 6–29)
AST SERPL W P-5'-P-CCNC: 17 U/L (ref 10–35)
BANDS,BANDS: ABNORMAL
BASOPHILS # BLD: 83 CELLS/UL (ref 0–200)
BASOPHILS NFR BLD: 1.3 %
BILIRUB SERPL-MCNC: 0.7 MG/DL (ref 0.2–1.2)
BLASTS,BLAST: ABNORMAL
BUN SERPL-MCNC: 19 MG/DL (ref 7–25)
BUN/CREATININE RATIO,BUCR: 15 (CALC) (ref 6–22)
CALCIUM SERPL-MCNC: 9.9 MG/DL (ref 8.6–10.4)
CHLORIDE SERPL-SCNC: 104 MMOL/L (ref 98–110)
CO2 SERPL-SCNC: 29 MMOL/L (ref 20–32)
COMMENT(S): ABNORMAL
CREAT SERPL-MCNC: 1.25 MG/DL (ref 0.6–0.88)
EOSINOPHIL # BLD: 128 CELLS/UL (ref 15–500)
EOSINOPHIL NFR BLD: 2 %
ERYTHROCYTE [DISTWIDTH] IN BLOOD BY AUTOMATED COUNT: 12.9 % (ref 11–15)
GLOBULIN,GLOB: 2.4 G/DL (CALC) (ref 1.9–3.7)
GLUCOSE SERPL-MCNC: 95 MG/DL (ref 65–99)
HCT VFR BLD AUTO: 45.2 % (ref 35–45)
HGB BLD-MCNC: 14.7 G/DL (ref 11.7–15.5)
LYMPHOCYTES # BLD: 2240 CELLS/UL (ref 850–3900)
LYMPHOCYTES NFR BLD: 35 %
MCH RBC QN AUTO: 30.4 PG (ref 27–33)
MCHC RBC AUTO-ENTMCNC: 32.5 G/DL (ref 32–36)
MCV RBC AUTO: 93.4 FL (ref 80–100)
METAMYELOCYTES,METAS: ABNORMAL
MONOCYTES # BLD: 621 CELLS/UL (ref 200–950)
MONOCYTES NFR BLD: 9.7 %
MYELOCYTES,MYELO: ABNORMAL
NEUTROPHILS # BLD AUTO: 3328 CELLS/UL (ref 1500–7800)
NEUTROPHILS # BLD: 52 %
NRBC: ABNORMAL
PLATELET # BLD AUTO: 269 THOUSAND/UL (ref 140–400)
PMV BLD AUTO: 10.2 FL (ref 7.5–12.5)
POTASSIUM SERPL-SCNC: 5.1 MMOL/L (ref 3.5–5.3)
PROMYELOCYTES,PRO: ABNORMAL
PROT SERPL-MCNC: 6.8 G/DL (ref 6.1–8.1)
RBC # BLD AUTO: 4.84 MILLION/UL (ref 3.8–5.1)
REACTIVE LYMPHS: ABNORMAL
SODIUM SERPL-SCNC: 138 MMOL/L (ref 135–146)
WBC # BLD AUTO: 6.4 THOUSAND/UL (ref 3.8–10.8)

## 2019-08-25 NOTE — PROGRESS NOTES
I will let her know at her follow-up appointment that her CMP is unremarkable except for a mildly elevated creatinine of 1.25. Her BUN is 19. Meanwhile, her CBC is unremarkable for significant abnormalities.     Dr. Kristin Johnston  [facility] of [address]  Phone: [phone]  Fax: [phone]

## 2019-09-11 ENCOUNTER — OFFICE VISIT (OUTPATIENT)
Dept: INTERNAL MEDICINE CLINIC | Age: 84
End: 2019-09-11

## 2019-09-11 VITALS
SYSTOLIC BLOOD PRESSURE: 135 MMHG | HEIGHT: 63 IN | TEMPERATURE: 97.4 F | OXYGEN SATURATION: 98 % | WEIGHT: 120 LBS | BODY MASS INDEX: 21.26 KG/M2 | RESPIRATION RATE: 12 BRPM | DIASTOLIC BLOOD PRESSURE: 51 MMHG | HEART RATE: 79 BPM

## 2019-09-11 DIAGNOSIS — N18.30 CKD (CHRONIC KIDNEY DISEASE), STAGE III (HCC): Primary | ICD-10-CM

## 2019-09-11 DIAGNOSIS — Z85.3 HISTORY OF BREAST CANCER: ICD-10-CM

## 2019-09-11 DIAGNOSIS — H40.9 GLAUCOMA, UNSPECIFIED GLAUCOMA TYPE, UNSPECIFIED LATERALITY: ICD-10-CM

## 2019-09-11 DIAGNOSIS — Z23 ENCOUNTER FOR IMMUNIZATION: ICD-10-CM

## 2019-09-11 DIAGNOSIS — Z13.220 SCREENING FOR HYPERLIPIDEMIA: ICD-10-CM

## 2019-09-11 RX ORDER — BIMATOPROST 0.1 MG/ML
1 SOLUTION/ DROPS OPHTHALMIC
COMMUNITY
Start: 2019-08-21

## 2019-09-11 NOTE — PATIENT INSTRUCTIONS
Health Maintenance Due   Topic Date Due    Shingrix Vaccine Age 49> (1 of 2) 05/13/1985    Pneumococcal 65+ years (2 of 2 - PCV13) 03/13/2018          Eating Healthy Foods: Care Instructions  Your Care Instructions    Eating healthy foods can help lower your risk for disease. Healthy food gives you energy and keeps your heart strong, your brain active, your muscles working, and your bones strong. A healthy diet includes a variety of foods from the basic food groups: grains, vegetables, fruits, milk and milk products, and meat and beans. Some people may eat more of their favorite foods from only one food group and, as a result, miss getting the nutrients they need. So, it is important to pay attention not only to what you eat but also to what you are missing from your diet. You can eat a healthy, balanced diet by making a few small changes. Follow-up care is a key part of your treatment and safety. Be sure to make and go to all appointments, and call your doctor if you are having problems. It's also a good idea to know your test results and keep a list of the medicines you take. How can you care for yourself at home? Look at what you eat  · Keep a food diary for a week or two and record everything you eat or drink. Track the number of servings you eat from each food group. · For a balanced diet every day, eat a variety of:  ? 6 or more ounce-equivalents of grains, such as cereals, breads, crackers, rice, or pasta, every day. An ounce-equivalent is 1 slice of bread, 1 cup of ready-to-eat cereal, or ½ cup of cooked rice, cooked pasta, or cooked cereal.  ? 2½ cups of vegetables, especially:  § Dark-green vegetables such as broccoli and spinach. § Orange vegetables such as carrots and sweet potatoes. § Dry beans (such as han and kidney beans) and peas (such as lentils). ? 2 cups of fresh, frozen, or canned fruit. A small apple or 1 banana or orange equals 1 cup.   ? 3 cups of nonfat or low-fat milk, yogurt, or other milk products. ? 5½ ounces of meat and beans, such as chicken, fish, lean meat, beans, nuts, and seeds. One egg, 1 tablespoon of peanut butter, ½ ounce nuts or seeds, or ¼ cup of cooked beans equals 1 ounce of meat. · Learn how to read food labels for serving sizes and ingredients. Fast-food and convenience-food meals often contain few or no fruits or vegetables. Make sure you eat some fruits and vegetables to make the meal more nutritious. · Look at your food diary. For each food group, add up what you have eaten and then divide the total by the number of days. This will give you an idea of how much you are eating from each food group. See if you can find some ways to change your diet to make it more healthy. Start small  · Do not try to make dramatic changes to your diet all at once. You might feel that you are missing out on your favorite foods and then be more likely to fail. · Start slowly, and gradually change your habits. Try some of the following:  ? Use whole wheat bread instead of white bread. ? Use nonfat or low-fat milk instead of whole milk. ? Eat brown rice instead of white rice, and eat whole wheat pasta instead of white-flour pasta. ? Try low-fat cheeses and low-fat yogurt. ? Add more fruits and vegetables to meals and have them for snacks. ? Add lettuce, tomato, cucumber, and onion to sandwiches. ? Add fruit to yogurt and cereal.  Enjoy food  · You can still eat your favorite foods. You just may need to eat less of them. If your favorite foods are high in fat, salt, and sugar, limit how often you eat them, but do not cut them out entirely. · Eat a wide variety of foods. Make healthy choices when eating out  · The type of restaurant you choose can help you make healthy choices. Even fast-food chains are now offering more low-fat or healthier choices on the menu. · Choose smaller portions, or take half of your meal home. · When eating out, try:  ?  A veggie pizza with a whole wheat crust or grilled chicken (instead of sausage or pepperoni). ? Pasta with roasted vegetables, grilled chicken, or marinara sauce instead of cream sauce. ? A vegetable wrap or grilled chicken wrap. ? Broiled or poached food instead of fried or breaded items. Make healthy choices easy  · Buy packaged, prewashed, ready-to-eat fresh vegetables and fruits, such as baby carrots, salad mixes, and chopped or shredded broccoli and cauliflower. · Buy packaged, presliced fruits, such as melon or pineapple. · Choose 100% fruit or vegetable juice instead of soda. Limit juice intake to 4 to 6 oz (½ to ¾ cup) a day. · Blend low-fat yogurt, fruit juice, and canned or frozen fruit to make a smoothie for breakfast or a snack. Where can you learn more? Go to http://danny-shonda.info/. Enter T756 in the search box to learn more about \"Eating Healthy Foods: Care Instructions. \"  Current as of: November 7, 2018  Content Version: 12.1  © 9379-4054 [employer]. Care instructions adapted under license by [employer] (which disclaims liability or warranty for this information). If you have questions about a medical condition or this instruction, always ask your healthcare professional. [address] any warranty or liability for your use of this information.

## 2019-09-11 NOTE — PROGRESS NOTES
Chief Complaint   Patient presents with    Cholesterol Problem     follow up  [address]     done 8-20-19       1. Have you been to the ER, urgent care clinic since your last visit? Hospitalized since your last visit? No    2. Have you seen or consulted any other health care providers outside of the [address] [place] [address] since your last visit? Include any pap smears or colon screening.  No  Health Maintenance Due   Topic Date Due    Shingrix Vaccine Age 49> (1 of 2) 05/13/1985    Pneumococcal 65+ years (2 of 2 - PCV13) 03/13/2018

## 2019-09-11 NOTE — PROGRESS NOTES
Raya Arguello 1935 female who presents for routine immunizations. Patient denies any symptoms , reactions or allergies that would exclude them from being immunized today. Risks and adverse reactions were discussed and the VIS was given to them. All questions were addressed. Order placed for PCV 13,  per Verbal Order from DR. PALMER with read back. Patient was observed for 15 min post injection. There were no reactions observed.     Augustine Crane LPN

## 2019-09-11 NOTE — PROGRESS NOTES
INTERNISTS OF [facility]:  9/11/2019, MRN: [id number]      Aurelio Arrieta is a 80 y.o. female and presents to clinic for Cholesterol Problem (follow up  ROOM  3) and Labs (done 8-20-19)    Subjective:   Patient is an 59-year-old female with history of cerebral atrophy per head CT findings, white coat hypertension, urethral polyp seen on cystoscopy, microscopic hematuria, h/o breast cancer (declining further mammograms), glaucoma, chronic kidney disease stage III, history of TIA. 1. CKD: Her most recent labs show:  A mildly elevated creatinine of 1.25. Her BUN is 19. No urinary symptoms. 2.  Glaucoma: She continues to get eye exams every 6 months. No eye pain. She has some redness along her eyes. She is taking eyedrops x2.    3.  Whitecoat Hypertension: Her BP is 135/51 today. At home, it is even lower. She is on any antihypertensive medications. 4.  History of Breast Cancer: No breast pain. In remission. The patient has declined mammograms. Patient Active Problem List    Diagnosis Date Noted    History of breast cancer 03/12/2018    White coat syndrome without hypertension 09/11/2017    Cerebral atrophy - seen on head CT 12/27/2016    Urethral polyp - seen on cytoscopy (thought to be the source of her microscopic hematuria) 12/27/2016    Microscopic hematuria 12/27/2016    Glaucoma 07/16/2013    CKD (chronic kidney disease), stage III ([facility] [address] 07/16/2013    H/O TIA (transient ischemic attack) and stroke 06/05/2013       Current Outpatient Medications   Medication Sig Dispense Refill    LUMIGAN 0.01 % ophthalmic drops Administer 1 Drop to right eye nightly.  RHOPRESSA 0.02 % drop INSTILL 1 DROP IN RIGHT EYE DAILY DINNER  6    propylene glycol/peg 400 (SYSTANE LIQUID GEL OP) Apply  to eye daily as needed.  Calcium Carbonate-Vit D3-Min 600 mg calcium- 400 unit tab Take  by mouth every other day.  aspirin 81 mg CpDR Take 81 mg by mouth daily.  Patient taking 2 81 mg tablets a day  multivitamin (ONE A DAY) tablet Take 1 Tab by mouth every other day. Allergies   Allergen Reactions    Alphagan P [Brimonidine] Itching    Pravastatin Swelling and Myalgia     Swelling in the throat, tongue    Codeine Nausea and Vomiting    Darvocet A500 [Propoxyphene N-Acetaminophen] Nausea and Vomiting    Darvon [Propoxyphene] Nausea and Vomiting    Demerol [Meperidine] Nausea and Vomiting    Entex [Phenylephrine-Guaifenesin] Nausea and Vomiting    Erythromycin Nausea and Vomiting    Other Medication Shortness of Breath     No beta blockers    Sulfa (Sulfonamide Antibiotics) Nausea and Vomiting    Timolol Nausea and Vomiting    Vicodin [Hydrocodone-Acetaminophen] Other (comments)     confusion       Past Medical History:   Diagnosis Date    Breast CA [facility]) [id number]    1984 left, 1986 right    CKD (chronic kidney disease) stage 3, GFR 30-59 ml/min ([facility])     Glaucoma     TIA (transient ischemic attack) 2009       Past Surgical History:   Procedure Laterality Date    HX CATARACT REMOVAL Left 2006    HX HYSTERECTOMY  1977   Reggy Males MASTECTOMY  [id number]       Family History   Problem Relation Age of Onset    Cancer Mother         breast    Hypertension Mother     Arthritis-osteo Mother     Dementia Mother     Hypertension Father     Heart Disease Father     Arthritis-osteo Father     Hypertension Sister     Diabetes Sister     Cancer Brother         lung    Hypertension Brother     Heart Disease Brother        Social History     Tobacco Use    Smoking status: Never Smoker    Smokeless tobacco: Never Used   Substance Use Topics    Alcohol use: No     Alcohol/week: 0.0 standard drinks       ROS   Review of Systems   Constitutional: Negative for chills and fever. HENT: Negative for ear pain and sore throat. Eyes: Positive for redness. Negative for blurred vision and pain. Respiratory: Negative for cough and shortness of breath.     Cardiovascular: Negative for chest pain.   Gastrointestinal: Negative for abdominal pain, blood in stool and melena. Genitourinary: Negative for dysuria and hematuria. Musculoskeletal: Negative for joint pain and myalgias. Skin: Negative for rash. Neurological: Negative for tingling, focal weakness and headaches. Endo/Heme/Allergies: Does not bruise/bleed easily. Psychiatric/Behavioral: Negative for substance abuse. Objective     Vitals:    09/11/19 0902   BP: 150/52   Pulse: 79   Resp: 12   Temp: 97.4 °F (36.3 °C)   TempSrc: Oral   SpO2: 98%   Weight: 120 lb (54.4 kg)   Height: 5' 3\" (1.6 m)   PainSc:   0 - No pain       Physical Exam   Constitutional: She is oriented to person, place, and time and well-developed, well-nourished, and in no distress. HENT:   Head: Normocephalic and atraumatic. Right Ear: External ear normal.   Left Ear: External ear normal.   Nose: Nose normal.   Mouth/Throat: Oropharynx is clear and moist. No oropharyngeal exudate. Eyes: EOM are normal. Right eye exhibits no discharge. Left eye exhibits no discharge. No scleral icterus. Slightly erythematous conjunctiva b/l   Neck: Neck supple. Cardiovascular: Normal rate, regular rhythm, normal heart sounds and intact distal pulses. Exam reveals no gallop and no friction rub. No murmur heard. Pulmonary/Chest: Effort normal and breath sounds normal. No respiratory distress. She has no wheezes. She has no rales. Abdominal: Soft. Bowel sounds are normal. She exhibits no distension. There is no tenderness. There is no rebound and no guarding. Musculoskeletal: She exhibits no edema or tenderness (BUE). Lymphadenopathy:     She has no cervical adenopathy. Neurological: She is alert and oriented to person, place, and time. She exhibits normal muscle tone. Gait normal.   Skin: Skin is warm and dry. No erythema. Psychiatric: Affect normal.   Nursing note and vitals reviewed.       LABS   Data Review:   Lab Results   Component Value Date/Time    WBC 6.4 08/20/2019 08:04 AM    HGB 14.7 08/20/2019 08:04 AM    HCT 45.2 (H) 08/20/2019 08:04 AM    PLATELET 477 60/95/0026 08:04 AM    MCV 93.4 08/20/2019 08:04 AM       Lab Results   Component Value Date/Time    Sodium 138 08/20/2019 08:04 AM    Potassium 5.1 08/20/2019 08:04 AM    Chloride 104 08/20/2019 08:04 AM    CO2 29 08/20/2019 08:04 AM    Glucose 95 08/20/2019 08:04 AM    BUN 19 08/20/2019 08:04 AM    Creatinine 1.25 (H) 08/20/2019 08:04 AM    BUN/Creatinine ratio 15 08/20/2019 08:04 AM    GFR est AA 46 (L) 08/20/2019 08:04 AM    GFR est non-AA 39 (L) 08/20/2019 08:04 AM    Calcium 9.9 08/20/2019 08:04 AM       Lab Results   Component Value Date/Time    Cholesterol, total 197 08/29/2018 08:04 AM    HDL Cholesterol 58 08/29/2018 08:04 AM    LDL-CHOLESTEROL 116 (H) 08/29/2018 08:04 AM    LDL, calculated 140 (H) 04/03/2014 12:00 AM    VLDL, calculated 27 04/03/2014 12:00 AM    Triglyceride 120 08/29/2018 08:04 AM    Cholesterol/HDL ratio 3.4 08/29/2018 08:04 AM       No results found for: HBA1C, HGBE8, UYE1MGOL, RJF6LBMT    Assessment/Plan:   1. Health Maintenance:  -Ordering a lipid panel to screen for HLD just before her follow-up appointment. ORDERS:  - LIPID PANEL; Future    2. Glaucoma: Stable. -Continue get formal eye exams.  -Continue with Rx per Ophthalmology recommendations. 3. History of breast cancer: In remission.  -Checking a BMP and a CBC just before her follow-up appointment. - The pneumococcal 13 vaccine was administered today.  -I encouraged her to get all recommended vaccinations. ORDERS:  - METABOLIC PANEL, BASIC; Future  - CBC WITH AUTOMATED DIFF; Future    4. CKD (chronic kidney disease), stage III: Likely age related. -Checking a BMP and a CBC just before her follow-up appointment. ORDERS:  - METABOLIC PANEL, BASIC; Future  - CBC WITH AUTOMATED DIFF;  Future      Health Maintenance Due   Topic Date Due    Shingrix Vaccine Age 49> (1 of 2) 05/13/1985    Pneumococcal 65+ years (2 of 2 - PCV13) 03/13/2018     Lab review: labs are reviewed in the EHR    I have discussed the diagnosis with the patient and the intended plan as seen in the above orders. The patient has received an after-visit summary and questions were answered concerning future plans. I have discussed medication side effects and warnings with the patient as well. I have reviewed the plan of care with the patient, accepted their input and they are in agreement with the treatment goals. All questions were answered. The patient understands the plan of care. Handouts provided today with above information. Pt instructed if symptoms worsen to call the office or report to the ED for continued care. Greater than 50% of the visit time was spent in counseling and/or coordination of care. Voice recognition was used to generate this report, which may have resulted in some phonetic based errors in grammar and contents. Even though attempts were made to correct all the mistakes, some may have been missed, and remained in the body of the document.           Hiram Mayo MD

## 2020-08-12 ENCOUNTER — TELEPHONE (OUTPATIENT)
Dept: INTERNAL MEDICINE CLINIC | Age: 85
End: 2020-08-12

## 2020-08-12 DIAGNOSIS — E87.5 HYPERKALEMIA: Primary | ICD-10-CM

## 2020-08-12 LAB
BASOPHILS # BLD: 93 CELLS/UL (ref 0–200)
BASOPHILS NFR BLD: 1.5 %
BUN SERPL-MCNC: 22 MG/DL (ref 7–25)
BUN/CREATININE RATIO,BUCR: 20 (CALC) (ref 6–22)
CALCIUM SERPL-MCNC: 9.9 MG/DL (ref 8.6–10.4)
CHLORIDE SERPL-SCNC: 103 MMOL/L (ref 98–110)
CHOL/HDL RATIO,CHHDX: 3.3 (CALC)
CHOLEST SERPL-MCNC: 210 MG/DL
CO2 SERPL-SCNC: 29 MMOL/L (ref 20–32)
CREAT SERPL-MCNC: 1.11 MG/DL (ref 0.6–0.88)
EOSINOPHIL # BLD: 130 CELLS/UL (ref 15–500)
EOSINOPHIL NFR BLD: 2.1 %
ERYTHROCYTE [DISTWIDTH] IN BLOOD BY AUTOMATED COUNT: 12.5 % (ref 11–15)
GLUCOSE SERPL-MCNC: 90 MG/DL (ref 65–99)
HCT VFR BLD AUTO: 47.1 % (ref 35–45)
HDLC SERPL-MCNC: 64 MG/DL
HGB BLD-MCNC: 15.1 G/DL (ref 11.7–15.5)
LDL-CHOLESTEROL: 124 MG/DL (CALC)
LYMPHOCYTES # BLD: 2244 CELLS/UL (ref 850–3900)
LYMPHOCYTES NFR BLD: 36.2 %
MCH RBC QN AUTO: 29.9 PG (ref 27–33)
MCHC RBC AUTO-ENTMCNC: 32.1 G/DL (ref 32–36)
MCV RBC AUTO: 93.3 FL (ref 80–100)
MONOCYTES # BLD: 570 CELLS/UL (ref 200–950)
MONOCYTES NFR BLD: 9.2 %
NEUTROPHILS # BLD AUTO: 3162 CELLS/UL (ref 1500–7800)
NEUTROPHILS # BLD: 51 %
NON-HDL CHOLESTEROL, 011976: 146 MG/DL (CALC)
PLATELET # BLD AUTO: 251 THOUSAND/UL (ref 140–400)
PMV BLD AUTO: 10.5 FL (ref 7.5–12.5)
POTASSIUM SERPL-SCNC: 5.4 MMOL/L (ref 3.5–5.3)
RBC # BLD AUTO: 5.05 MILLION/UL (ref 3.8–5.1)
SODIUM SERPL-SCNC: 141 MMOL/L (ref 135–146)
TRIGL SERPL-MCNC: 114 MG/DL (ref ?–150)
WBC # BLD AUTO: 6.2 THOUSAND/UL (ref 3.8–10.8)

## 2020-08-12 NOTE — TELEPHONE ENCOUNTER
----- Message from Paula Leiva MD sent at 8/12/2020 11:10 AM EDT -----  Please let her know. \:  Her total cholesterol is 210. HDL is 64. Her triglycerides are 114. Her LDL is 124. Her kidney function is unchanged with a creatinine of 1.1.  BUN is 22. Her potassium was mildly elevated at 5.4, likely from lab error from hemolysis in the 2. Her CBC is unremarkable. Please schedule her for a follow-up potassium check within the next week.     Dr. Reece Sport  [facility] of [facility], [address] [place] [address], [facility] [address]  Phone: [phone]  Fax: [phone]

## 2020-08-12 NOTE — PROGRESS NOTES
Please let her know. \:  Her total cholesterol is 210. HDL is 64. Her triglycerides are 114. Her LDL is 124. Her kidney function is unchanged with a creatinine of 1.1.  BUN is 22. Her potassium was mildly elevated at 5.4, likely from lab error from hemolysis in the 2. Her CBC is unremarkable. Please schedule her for a follow-up potassium check within the next week.     Dr. Brissa Vo  Internists of [address], [facility] [address]  Phone: [phone]  Fax: [phone]

## 2020-08-12 NOTE — TELEPHONE ENCOUNTER
Pt returned call and given Dr Steiner Bottom message. Pt requested lab orders to be mailed/ she goes to [email or web address] for collection.       Lab order copied and to be mailed 08/13/2020

## 2020-08-21 ENCOUNTER — TELEPHONE (OUTPATIENT)
Dept: INTERNAL MEDICINE CLINIC | Age: 85
End: 2020-08-21

## 2020-08-21 LAB
BUN SERPL-MCNC: 19 MG/DL (ref 7–25)
BUN/CREATININE RATIO,BUCR: 16 (CALC) (ref 6–22)
CALCIUM SERPL-MCNC: 10.2 MG/DL (ref 8.6–10.4)
CHLORIDE SERPL-SCNC: 102 MMOL/L (ref 98–110)
CO2 SERPL-SCNC: 30 MMOL/L (ref 20–32)
CREAT SERPL-MCNC: 1.19 MG/DL (ref 0.6–0.88)
GLUCOSE SERPL-MCNC: 82 MG/DL (ref 65–99)
POTASSIUM SERPL-SCNC: 5.4 MMOL/L (ref 3.5–5.3)
SODIUM SERPL-SCNC: 141 MMOL/L (ref 135–146)

## 2020-08-21 NOTE — PROGRESS NOTES
Her potassium is unchanged and mildly elevated. Please have her stop her MV daily and stay hydrated, drinking water. The remaining electrolytes are normal. Please schedule her for an in office BP check to assess her pressure given her mildly elevated potassium.     Dr. Eriberto Johnson  Internists of [facility], [address] [place] [address], [address] [place] [address] Str.  Phone: [phone]  Fax: [phone]

## 2020-08-21 NOTE — TELEPHONE ENCOUNTER
----- Message from Tomasa العراقي MD sent at 8/21/2020  1:21 PM EDT -----  Her potassium is unchanged and mildly elevated. Please have her stop her MV daily and stay hydrated, drinking water. The remaining electrolytes are normal. Please schedule her for an in office BP check to assess her pressure given her mildly elevated potassium.     Dr. Eriberto Johnson  Internists of [facility], [address] [place] [address], [address] [place] [address] Str.  Phone: [phone]  Fax: [phone]

## 2020-08-25 ENCOUNTER — TELEPHONE (OUTPATIENT)
Dept: INTERNAL MEDICINE CLINIC | Age: 85
End: 2020-08-25

## 2020-08-25 NOTE — TELEPHONE ENCOUNTER
Please have her hold all of these supplements for now, pending her follow up apt with me.     Dr. Branden Raphael  Internists of [facility], [facility], [address] [place] [address] Str.  Phone: [phone]  Fax: [phone]

## 2020-08-25 NOTE — TELEPHONE ENCOUNTER
Patient dropped of a list of vitamins she is taking and wanted to know if these could be the cause of her abnormal or mildly elevated potassium or other values. Patient wanted to know once the list has been reviewed does Dr. Tisha Krabbe want her to stop her mult vit permanently. Patient states she takes this vitamin Mon, Wed, and Friday. Equate Women's Dietary Supplement    Amount per serving:  Vit A 700 mcg (10 % as beta Carotene),    Vit K 25 mcg  Vit C 75 mg                                                  Thiamine 1.2 mg  Vit D 25 mcg(1,000 I. U)                               Niacin 16 mg  Vit E 7.5 mg                                                  Riboflavin 1.3 mg  Vit B12-- 6 mcg                                             Folate 043 TSY(315 mcg Folic acid)  Vit B6-- 1.7 mcg                                            Biotin 1,000 mcg  Pantothenic Acid 5 mg                                  Calcium 380 mg  Iron 18 mg                                                     Iodine 150 mcg  Zinc 8 mg                                                     Selenium 27.5 mg  Copper 0.9 mg                                             Manganese 1.8 mg  Chromium 25 mcg    List also placed in providers box.

## 2020-08-25 NOTE — TELEPHONE ENCOUNTER
Left message for patient per Dr. Phillip Dyson hold all of these supplements for now, and bring all medications including vitamins, herb and supplements to appointment with Dr. Phillip Dyson. Any questions please call the office for an appointment.

## 2020-09-16 ENCOUNTER — VIRTUAL VISIT (OUTPATIENT)
Dept: INTERNAL MEDICINE CLINIC | Age: 85
End: 2020-09-16

## 2020-09-16 DIAGNOSIS — N18.30 CKD (CHRONIC KIDNEY DISEASE), STAGE III (HCC): Primary | ICD-10-CM

## 2020-09-16 DIAGNOSIS — H40.9 GLAUCOMA, UNSPECIFIED GLAUCOMA TYPE, UNSPECIFIED LATERALITY: ICD-10-CM

## 2020-09-16 DIAGNOSIS — E87.5 HYPERKALEMIA: ICD-10-CM

## 2020-09-16 DIAGNOSIS — E78.5 HYPERLIPIDEMIA, UNSPECIFIED HYPERLIPIDEMIA TYPE: ICD-10-CM

## 2020-09-16 DIAGNOSIS — Z86.73 H/O TIA (TRANSIENT ISCHEMIC ATTACK) AND STROKE: ICD-10-CM

## 2020-09-16 DIAGNOSIS — Z85.3 HISTORY OF BREAST CANCER: ICD-10-CM

## 2020-09-16 NOTE — PROGRESS NOTES
Isis Francisco is a 80 y.o. female who was seen by synchronous (real-time) audio-phone only technology on 9/16/2020. Assessment & Plan:   1. CKD:   - Checking labs in 6 months. 2. Glaucoma: Stable. - C/w IOP checks with Ophthlamology   - C/w rx as prescribed. 3. TIA Hx and HLD:   C/w ASA. Checking labs in 6 months. 4. Health Maintenance:  - She declined getting the flu shot.  -She wants to have a follow-up visit in the office in a year to months. I will honor her request but I instructed her to notify me if she has any changes to her body or medical issues that arise in between appointments. Meanwhile, I will check labs in 6 months as mentioned above. 5. H/o Breast Cancer:  She continues to decline surveillance studies. Lab review: labs are reviewed in the EHR and ordered as mentioned above. I have discussed the diagnosis with the patient and the intended plan as seen in the above orders. I have discussed medication side effects and warnings with the patient as well. I have reviewed the plan of care with the patient, accepted their input and they are in agreement with the treatment goals. All questions were answered. The patient understands the plan of care. Pt instructed if symptoms worsen to call the office or report to the ED for continued care. Greater than 50% of the visit time was spent in counseling and/or coordination of care. I spent 15 minutes with the patient for this phone only encounter. Voice recognition was used to generate this report, which may have resulted in some phonetic based errors in grammar and contents. Even though attempts were made to correct all the mistakes, some may have been missed, and remained in the body of the document.       Subjective:   Isis Francisco was seen for   Chief Complaint   Patient presents with    Follow-up     Patient is an 66-year-old female with history of cerebral atrophy per head CT findings, white coat hypertension, urethral polyp seen on cystoscopy, microscopic hematuria, h/o breast cancer (declining further mammograms), glaucoma, chronic kidney disease stage III, history of TIA. 1. CKD:  Creatinine is better than it was a year ago. Her potassium was mildly elevated at 5.4. She admits to eating a lot of potassium rich foods per our conversation today. Home BP checks: yes. BP is 126/69 and  HR is 66 today. No hematuria. 2. Glaucoma: Last eye exam: 8/26/20. Taking: rx eye drops. She was told to f/u with her eye doctor in 6 months. Vision changes: none. Eye pain: none. She wears glasses. 3. H/o Breast Cancer: In remission. She continues to decline surveillance studies. 4. Health Maintenance:  - Flu shot: Overdue. She declines it. 5. HLD/TIA: Not on rx to lower her cholesterol. Her total cholesterol is 210. Triglycerides are 114. HDL is 64. LDL is 124. +ASA. Overall, her cholesterol is a little worse since 2018. +H/o statin intolerance. Prior to Admission medications    Medication Sig Start Date End Date Taking? Authorizing Provider   LUMIGAN 0.01 % ophthalmic drops Administer 1 Drop to right eye nightly. 8/21/19   Provider, Historical   RHOPRESSA 0.02 % drop INSTILL 1 DROP IN RIGHT EYE DAILY DINNER 7/20/18   Provider, Historical   propylene glycol/peg 400 (SYSTANE LIQUID GEL OP) Apply  to eye daily as needed. Provider, Historical   Calcium Carbonate-Vit D3-Min 600 mg calcium- 400 unit tab Take  by mouth every other day. Provider, Historical   aspirin 81 mg CpDR Take 81 mg by mouth daily. Patient taking 2 81 mg tablets a day    Provider, Historical   multivitamin (ONE A DAY) tablet Take 1 Tab by mouth every other day.     Provider, Historical     Allergies   Allergen Reactions    Alphagan P [Brimonidine] Itching    Pravastatin Swelling and Myalgia     Swelling in the throat, tongue    Codeine Nausea and Vomiting    Darvocet A500 [Propoxyphene N-Acetaminophen] Nausea and Vomiting  Darvon [Propoxyphene] Nausea and Vomiting    Demerol [Meperidine] Nausea and Vomiting    Entex [Phenylephrine-Guaifenesin] Nausea and Vomiting    Erythromycin Nausea and Vomiting    Other Medication Shortness of Breath     No beta blockers    Sulfa (Sulfonamide Antibiotics) Nausea and Vomiting    Timolol Nausea and Vomiting    Vicodin [Hydrocodone-Acetaminophen] Other (comments)     confusion     Past Medical History:   Diagnosis Date    Breast CA [facility]) [id number]    1984 left, 1986 right    CKD (chronic kidney disease) stage 3, GFR 30-59 ml/min ([facility])     Glaucoma     TIA (transient ischemic attack) 2009     Past Surgical History:   Procedure Laterality Date    HX CATARACT REMOVAL Left 2006    HX HYSTERECTOMY  1977   Faby [place] MASTECTOMY  [id number]     Family History   Problem Relation Age of Onset    Cancer Mother         breast    Hypertension Mother     Arthritis-osteo Mother     Dementia Mother     Hypertension Father     Heart Disease Father     Arthritis-osteo Father     Hypertension Sister     Diabetes Sister     Cancer Brother         lung    Hypertension Brother     Heart Disease Brother      Social History     Socioeconomic History    Marital status:      Spouse name: Not on file    Number of children: Not on file    Years of education: Not on file    Highest education level: Not on file   Tobacco Use    Smoking status: Never Smoker    Smokeless tobacco: Never Used   Substance and Sexual Activity    Alcohol use: No     Alcohol/week: 0.0 standard drinks    Drug use: Yes     Types: Prescription, OTC    Sexual activity: Not Currently     Partners: Male   Social History Narrative    Activity level-walks 2 miles 1x week on Sunday    Engages in gardening, routine house chores without difficulty               ROS:  Gen: No fever/chills  HEENT: No sore throat, eye pain, ear pain, or congestion.  No HA  CV: No CP  Resp: No cough/SOB  GI: No abdominal pain  : No hematuria/dysuria  Derm: No rash  Neuro: No new paresthesias/weakness  Musc: No new myalgias/jt pain  Psych: No depression sx      LABS:  Lab Results   Component Value Date/Time    Sodium 141 08/20/2020 08:09 AM    Potassium 5.4 (H) 08/20/2020 08:09 AM    Chloride 102 08/20/2020 08:09 AM    CO2 30 08/20/2020 08:09 AM    Glucose 82 08/20/2020 08:09 AM    BUN 19 08/20/2020 08:09 AM    Creatinine 1.19 (H) 08/20/2020 08:09 AM    BUN/Creatinine ratio 16 08/20/2020 08:09 AM    GFR est AA 48 (L) 08/20/2020 08:09 AM    GFR est non-AA 42 (L) 08/20/2020 08:09 AM    Calcium 10.2 08/20/2020 08:09 AM       Lab Results   Component Value Date/Time    Cholesterol, total 210 (H) 08/11/2020 08:06 AM    HDL Cholesterol 64 08/11/2020 08:06 AM    LDL-CHOLESTEROL 124 (H) 08/11/2020 08:06 AM    LDL, calculated 140 (H) 04/03/2014 12:00 AM    VLDL, calculated 27 04/03/2014 12:00 AM    Triglyceride 114 08/11/2020 08:06 AM    Cholesterol/HDL ratio 3.3 08/11/2020 08:06 AM       Lab Results   Component Value Date/Time    WBC 6.2 08/11/2020 08:06 AM    HGB 15.1 08/11/2020 08:06 AM    HCT 47.1 (H) 08/11/2020 08:06 AM    PLATELET 893 20/92/9912 08:06 AM    MCV 93.3 08/11/2020 08:06 AM       No results found for: HBA1C, HGBE8, FGH7KDQB, CEC1SGPP    Lab Results   Component Value Date/Time    TSH 3.490 04/03/2014 12:00 AM           Due to this being a TeleHealth phone only evaluation, many elements of the physical examination are unable to be assessed. The pt was seen by synchronous (real-time) audio-phone only technology, and/or her healthcare decision maker, is aware that this patient-initiated, Telehealth encounter is a billable service, with coverage as determined by her insurance carrier. She is aware that she may receive a bill and has provided verbal consent to proceed: Yes. The pt is being evaluated by a phone only visit encounter for concerns as above. A caregiver was present when appropriate.  Due to this being a TeleHealth encounter (During RNYVF-47 public health emergency), evaluation of the following organ systems was limited: Vitals/Constitutional/EENT/Resp/CV/GI//MS/Neuro/Skin/Heme-Lymph-Imm. Pursuant to the emergency declaration under the [facility], [address] [place] [address] and the [email or web address] and [employer] Act, this Virtual phone only Visit was conducted, with patient's (and/or legal guardian's) consent, to reduce the patient's risk of exposure to COVID-19 and provide necessary medical care. Services were provided through a phone only synchronous discussion virtually to substitute for in-person clinic visit. Patient and provider were located at their individual homes. We discussed the expected course, resolution and complications of the diagnosis(es) in detail. Medication risks, benefits, costs, interactions, and alternatives were discussed as indicated. I advised her to contact the office if her condition worsens, changes or fails to improve as anticipated. She expressed understanding with the diagnosis(es) and plan.      Anibal Ruiz MD

## 2021-03-21 LAB
ALB/GLOBRATIO, 58C: 1.6 (CALC) (ref 1–2.5)
ALBUMIN SERPL-MCNC: 4.1 G/DL (ref 3.6–5.1)
ALDOSTERONE, LC/MS/MS: 4 NG/DL
ALKALINE PHOSPHATASE, TOTAL, 25002000: 70 U/L (ref 37–153)
ALT SERPL-CCNC: 12 U/L (ref 6–29)
AST SERPL W P-5'-P-CCNC: 18 U/L (ref 10–35)
BASOPHILS # BLD: 72 CELLS/UL (ref 0–200)
BASOPHILS NFR BLD: 1.2 %
BILIRUB SERPL-MCNC: 0.5 MG/DL (ref 0.2–1.2)
BUN SERPL-MCNC: 20 MG/DL (ref 7–25)
BUN/CREATININE RATIO,BUCR: 19 (CALC) (ref 6–22)
CALCIUM SERPL-MCNC: 9.4 MG/DL (ref 8.6–10.4)
CHLORIDE SERPL-SCNC: 106 MMOL/L (ref 98–110)
CHOL/HDL RATIO,CHHDX: 3.2 (CALC)
CHOLEST SERPL-MCNC: 195 MG/DL
CO2 SERPL-SCNC: 30 MMOL/L (ref 20–32)
CREAT SERPL-MCNC: 1.04 MG/DL (ref 0.6–0.88)
EOSINOPHIL # BLD: 168 CELLS/UL (ref 15–500)
EOSINOPHIL NFR BLD: 2.8 %
ERYTHROCYTE [DISTWIDTH] IN BLOOD BY AUTOMATED COUNT: 12.4 % (ref 11–15)
GLOBULIN,GLOB: 2.6 G/DL (CALC) (ref 1.9–3.7)
GLUCOSE SERPL-MCNC: 95 MG/DL (ref 65–99)
HCT VFR BLD AUTO: 45.6 % (ref 35–45)
HDLC SERPL-MCNC: 61 MG/DL
HGB BLD-MCNC: 15 G/DL (ref 11.7–15.5)
LDL-CHOLESTEROL: 116 MG/DL (CALC)
LYMPHOCYTES # BLD: 2052 CELLS/UL (ref 850–3900)
LYMPHOCYTES NFR BLD: 34.2 %
MCH RBC QN AUTO: 30.5 PG (ref 27–33)
MCHC RBC AUTO-ENTMCNC: 32.9 G/DL (ref 32–36)
MCV RBC AUTO: 92.9 FL (ref 80–100)
MONOCYTES # BLD: 528 CELLS/UL (ref 200–950)
MONOCYTES NFR BLD: 8.8 %
NEUTROPHILS # BLD AUTO: 3180 CELLS/UL (ref 1500–7800)
NEUTROPHILS # BLD: 53 %
NON-HDL CHOLESTEROL, 011976: 134 MG/DL (CALC)
PLATELET # BLD AUTO: 245 THOUSAND/UL (ref 140–400)
PMV BLD AUTO: 10.3 FL (ref 7.5–12.5)
POTASSIUM SERPL-SCNC: 4.8 MMOL/L (ref 3.5–5.3)
PROT SERPL-MCNC: 6.7 G/DL (ref 6.1–8.1)
RBC # BLD AUTO: 4.91 MILLION/UL (ref 3.8–5.1)
SODIUM SERPL-SCNC: 143 MMOL/L (ref 135–146)
TRIGL SERPL-MCNC: 82 MG/DL (ref ?–150)
WBC # BLD AUTO: 6 THOUSAND/UL (ref 3.8–10.8)

## 2021-03-23 ENCOUNTER — TELEPHONE (OUTPATIENT)
Dept: INTERNAL MEDICINE CLINIC | Age: 86
End: 2021-03-23

## 2021-03-23 NOTE — TELEPHONE ENCOUNTER
----- Message from Quique Diane MD sent at 3/23/2021  6:39 AM EDT -----  Let her know that her total cholesterol is down to 195 2010. HDL is 61. Triglycerides are 82. Her LDL is down to 116 from 124. Her CMP does not show any significant abnormalities other than a creatinine of 1.04. Her kidney function is unchanged and at her baseline. LFTs are normal.  Her CBC is unremarkable. We can continue to hold off on a cholesterol-lowering medication since her cholesterol is trending down if this is what she would like but since her LDL is still not at goal though (<100), please offer the patient the option of taking a cholesterol-lowering agent to further reduce her cholesterol. If she is agreeable to taking a cholesterol-lowering medication, please let me know so that I may order it.     Dr. Nadia Smallwood  Internists of [facility], [address] [place] [address], [facility] [address]  Phone: [phone]  Fax: [phone]

## 2021-03-23 NOTE — PROGRESS NOTES
Let her know that her total cholesterol is down to 195 2010. HDL is 61. Triglycerides are 82. Her LDL is down to 116 from 124. Her CMP does not show any significant abnormalities other than a creatinine of 1.04. Her kidney function is unchanged and at her baseline. LFTs are normal.  Her CBC is unremarkable. We can continue to hold off on a cholesterol-lowering medication since her cholesterol is trending down if this is what she would like but since her LDL is still not at goal though (<100), please offer the patient the option of taking a cholesterol-lowering agent to further reduce her cholesterol. If she is agreeable to taking a cholesterol-lowering medication, please let me know so that I may order it.     Dr. Venkat Medina  [facility], [address] [place] [address], [address] [place] [address].  Phone: [phone]  Fax: [phone]

## 2021-09-07 NOTE — PROGRESS NOTES
Francisca Last presents today for   Chief Complaint   Patient presents with    Follow-up              Coordination of Care:  1. Have you been to the ER, urgent care clinic since your last visit? Hospitalized since your last visit? NO    2. Have you seen or consulted any other health care providers outside of the [address] [place] [address] since your last visit? Include any pap smears or colon screening.  YES

## 2021-09-08 ENCOUNTER — OFFICE VISIT (OUTPATIENT)
Dept: INTERNAL MEDICINE CLINIC | Age: 86
End: 2021-09-08
Payer: OTHER GOVERNMENT

## 2021-09-08 VITALS
DIASTOLIC BLOOD PRESSURE: 57 MMHG | OXYGEN SATURATION: 97 % | TEMPERATURE: 97.8 F | WEIGHT: 126 LBS | RESPIRATION RATE: 16 BRPM | HEIGHT: 63 IN | BODY MASS INDEX: 22.32 KG/M2 | HEART RATE: 82 BPM | SYSTOLIC BLOOD PRESSURE: 147 MMHG

## 2021-09-08 DIAGNOSIS — Z85.3 HISTORY OF BREAST CANCER: ICD-10-CM

## 2021-09-08 DIAGNOSIS — H40.9 GLAUCOMA, UNSPECIFIED GLAUCOMA TYPE, UNSPECIFIED LATERALITY: ICD-10-CM

## 2021-09-08 DIAGNOSIS — R03.0 WHITE COAT SYNDROME WITHOUT HYPERTENSION: ICD-10-CM

## 2021-09-08 DIAGNOSIS — Z86.73 H/O TIA (TRANSIENT ISCHEMIC ATTACK) AND STROKE: ICD-10-CM

## 2021-09-08 DIAGNOSIS — E78.5 HYPERLIPIDEMIA, UNSPECIFIED HYPERLIPIDEMIA TYPE: Primary | ICD-10-CM

## 2021-09-08 PROCEDURE — 1090F PRES/ABSN URINE INCON ASSESS: CPT | Performed by: INTERNAL MEDICINE

## 2021-09-08 PROCEDURE — 1101F PT FALLS ASSESS-DOCD LE1/YR: CPT | Performed by: INTERNAL MEDICINE

## 2021-09-08 PROCEDURE — G8510 SCR DEP NEG, NO PLAN REQD: HCPCS | Performed by: INTERNAL MEDICINE

## 2021-09-08 PROCEDURE — G8427 DOCREV CUR MEDS BY ELIG CLIN: HCPCS | Performed by: INTERNAL MEDICINE

## 2021-09-08 PROCEDURE — 99214 OFFICE O/P EST MOD 30 MIN: CPT | Performed by: INTERNAL MEDICINE

## 2021-09-08 PROCEDURE — G8536 NO DOC ELDER MAL SCRN: HCPCS | Performed by: INTERNAL MEDICINE

## 2021-09-08 PROCEDURE — G8420 CALC BMI NORM PARAMETERS: HCPCS | Performed by: INTERNAL MEDICINE

## 2021-09-08 NOTE — PATIENT INSTRUCTIONS
Well Visit, Over 72: Care Instructions  Overview     Well visits can help you stay healthy. Your doctor has checked your overall health and may have suggested ways to take good care of yourself. Your doctor also may have recommended tests. At home, you can help prevent illness with healthy eating, regular exercise, and other steps. Follow-up care is a key part of your treatment and safety. Be sure to make and go to all appointments, and call your doctor if you are having problems. It's also a good idea to know your test results and keep a list of the medicines you take. How can you care for yourself at home? · Get screening tests that you and your doctor decide on. Screening helps find diseases before any symptoms appear. · Eat healthy foods. Choose fruits, vegetables, whole grains, protein, and low-fat dairy foods. Limit fat, especially saturated fat. Reduce salt in your diet. · Limit alcohol. If you are a man, have no more than 2 drinks a day or 14 drinks a week. If you are a woman, have no more than 1 drink a day or 7 drinks a week. Since alcohol affects older adults differently, you may want to limit alcohol even more. Or you may not want to drink at all. · Get at least 30 minutes of exercise on most days of the week. Walking is a good choice. You also may want to do other activities, such as running, swimming, cycling, or playing tennis or team sports. · Reach and stay at a healthy weight. This will lower your risk for many problems, such as obesity, diabetes, heart disease, and high blood pressure. · Do not smoke. Smoking can make health problems worse. If you need help quitting, talk to your doctor about stop-smoking programs and medicines. These can increase your chances of quitting for good. · Care for your mental health. It is easy to get weighed down by worry and stress. Learn strategies to manage stress, like deep breathing and mindfulness, and stay connected with your family and community. If you find you often feel sad or hopeless, talk with your doctor. Treatment can help. · Talk to your doctor about whether you have any risk factors for sexually transmitted infections (STIs). You can help prevent STIs if you wait to have sex with a new partner (or partners) until you've each been tested for STIs. It also helps if you use condoms (male or female condoms) and if you limit your sex partners to one person who only has sex with you. Vaccines are available for some STIs. · If you think you may have a problem with alcohol or drug use, talk to your doctor. This includes prescription medicines (such as amphetamines and opioids) and illegal drugs (such as cocaine and methamphetamine). Your doctor can help you figure out what type of treatment is best for you. · Protect your skin from too much sun. When you're outdoors from 10 a.m. to 4 p.m., stay in the shade or cover up with clothing and a hat with a wide brim. Wear sunglasses that block UV rays. Even when it's cloudy, put broad-spectrum sunscreen (SPF 30 or higher) on any exposed skin. · See a dentist one or two times a year for checkups and to have your teeth cleaned. · Wear a seat belt in the car. When should you call for help? Watch closely for changes in your health, and be sure to contact your doctor if you have any problems or symptoms that concern you. Where can you learn more? Go to http://www.gray.com/  Enter [id number] in the search box to learn more about \"Well Visit, Over 65: Care Instructions. \"  Current as of: May 27, 2020               Content Version: 12.8  © 8581-7074 [employer], Incorporated. Care instructions adapted under license by [employer] (which disclaims liability or warranty for this information).  If you have questions about a medical condition or this instruction, always ask your healthcare professional. [address] any warranty or liability for your use of this information.

## 2021-09-08 NOTE — PROGRESS NOTES
[facility]:  9/8/2021, MRN: [id number]      Maren Kumar is a 80 y.o. female and presents to clinic for Follow-up    Subjective: The patient is an 59-year-old female with history of cerebral atrophy per head CT findings, white coat hypertension, urethral polyp seen on cystoscopy, microscopic hematuria, h/o breast cancer (declining further mammograms) s/p b/l mastectomy, glaucoma, chronic kidney disease stage III, history of TIA. 1. Glaucoma: Vision: unchanged. Eye pain: none. Using rx eye drops. Next apt with her ophthalmologist: 10/13/21. She sees her eye doctor every 6 months. 2. H/o Breast Cancer: The patient continues to decline getting a mammogram.  No breast pain or masses. 3.  Health maintenance:  The patient declines her flu and shingrix shots.   - She received her Covid vaccinations. - +White coat HTN. She not been checking her blood pressure at home and has a machine. 4. HLD/TIA: Her labs from earlier this year show: Her total cholesterol is down to 195 . HDL is 61. Triglycerides are 82. Her LDL is down to 116 from 124. Her CMP does not show any significant abnormalities other than a creatinine of 1.04. Her kidney function is unchanged and at her baseline. LFTs are normal.   Taking ASA. She was unable to tolerate pravastatin. Patient Active Problem List    Diagnosis Date Noted    History of breast cancer 03/12/2018    White coat syndrome without hypertension 09/11/2017    Cerebral atrophy - seen on head CT 12/27/2016    Urethral polyp - seen on cytoscopy (thought to be the source of her microscopic hematuria) 12/27/2016    Microscopic hematuria 12/27/2016    Glaucoma 07/16/2013    CKD (chronic kidney disease), stage III ([facility] [address] 07/16/2013    H/O TIA (transient ischemic attack) and stroke 06/05/2013       Current Outpatient Medications   Medication Sig Dispense Refill    LUMIGAN 0.01 % ophthalmic drops Administer 1 Drop to right eye nightly.       RHOPRESSA 0.02 % drop INSTILL 1 DROP IN RIGHT EYE DAILY DINNER  6    propylene glycol/peg 400 (SYSTANE LIQUID GEL OP) Apply  to eye daily as needed.  aspirin 81 mg CpDR Take 81 mg by mouth daily. Patient taking 2 81 mg tablets a day      multivitamin (ONE A DAY) tablet Take 1 Tab by mouth every other day.  Calcium Carbonate-Vit D3-Min 600 mg calcium- 400 unit tab Take  by mouth every other day.  (Patient not taking: Reported on 9/8/2021)         Allergies   Allergen Reactions    Alphagan P [Brimonidine] Itching    Pravastatin Swelling and Myalgia     Swelling in the throat, tongue    Codeine Nausea and Vomiting    Darvocet A500 [Propoxyphene N-Acetaminophen] Nausea and Vomiting    Darvon [Propoxyphene] Nausea and Vomiting    Demerol [Meperidine] Nausea and Vomiting    Entex [Phenylephrine-Guaifenesin] Nausea and Vomiting    Erythromycin Nausea and Vomiting    Other Medication Shortness of Breath     No beta blockers    Sulfa (Sulfonamide Antibiotics) Nausea and Vomiting    Timolol Nausea and Vomiting    Vicodin [Hydrocodone-Acetaminophen] Other (comments)     confusion       Past Medical History:   Diagnosis Date    Breast CA [facility]) [id number]    1984 left, 1986 right    CKD (chronic kidney disease) stage 3, GFR 30-59 ml/min ([facility])     Glaucoma     TIA (transient ischemic attack) 2009       Past Surgical History:   Procedure Laterality Date    HX CATARACT REMOVAL Left 2006    HX HYSTERECTOMY  1977   Cristina Eaves MASTECTOMY  [id number]       Family History   Problem Relation Age of Onset    Cancer Mother         breast    Hypertension Mother     Arthritis-osteo Mother     Dementia Mother     Hypertension Father     Heart Disease Father     Arthritis-osteo Father     Hypertension Sister     Diabetes Sister     Cancer Brother         lung    Hypertension Brother     Heart Disease Brother        Social History     Tobacco Use    Smoking status: Never Smoker    Smokeless tobacco: Never Used Substance Use Topics    Alcohol use: No     Alcohol/week: 0.0 standard drinks       ROS   Review of Systems   Constitutional: Negative for chills and fever. HENT: Negative for ear pain and sore throat. Eyes: Negative for blurred vision and pain. Respiratory: Negative for cough and shortness of breath. Cardiovascular: Negative for chest pain. Gastrointestinal: Negative for abdominal pain, blood in stool and melena. Genitourinary: Negative for dysuria and hematuria. Musculoskeletal: Negative for joint pain and myalgias. Skin: Negative for rash. Neurological: Negative for tingling, focal weakness and headaches. Endo/Heme/Allergies: Does not bruise/bleed easily. Psychiatric/Behavioral: Negative for substance abuse. Objective     Vitals:    09/08/21 0904   Resp: 16   Temp: 97.8 °F (36.6 °C)   TempSrc: Temporal   Weight: 126 lb (57.2 kg)   Height: 5' 3\" (1.6 m)   PainSc:   0 - No pain       Physical Exam  Vitals and nursing note reviewed. HENT:      Head: Normocephalic and atraumatic. Right Ear: External ear normal.      Left Ear: External ear normal.   Eyes:      General: No scleral icterus. Right eye: No discharge. Left eye: No discharge. Conjunctiva/sclera: Conjunctivae normal.   Cardiovascular:      Rate and Rhythm: Normal rate and regular rhythm. Heart sounds: Normal heart sounds. No murmur heard. No friction rub. No gallop. Pulmonary:      Effort: Pulmonary effort is normal. No respiratory distress. Breath sounds: Normal breath sounds. No wheezing or rales. Abdominal:      General: Bowel sounds are normal. There is no distension. Palpations: Abdomen is soft. There is no mass. Tenderness: There is no abdominal tenderness. There is no guarding or rebound. Musculoskeletal:         General: No swelling or tenderness (BUE). Cervical back: Neck supple. Lymphadenopathy:      Cervical: No cervical adenopathy.    Skin:     General: Skin is warm and dry. Findings: No erythema. Neurological:      Mental Status: She is alert and oriented to person, place, and time. Motor: No abnormal muscle tone. Gait: Gait is intact. Gait normal.   Psychiatric:         Mood and Affect: Mood and affect normal.         LABS   Data Review:   Lab Results   Component Value Date/Time    WBC 6.0 03/17/2021 08:06 AM    HGB 15.0 03/17/2021 08:06 AM    HCT 45.6 (H) 03/17/2021 08:06 AM    PLATELET 520 47/74/0744 08:06 AM    MCV 92.9 03/17/2021 08:06 AM       Lab Results   Component Value Date/Time    Sodium 143 03/17/2021 08:06 AM    Potassium 4.8 03/17/2021 08:06 AM    Chloride 106 03/17/2021 08:06 AM    CO2 30 03/17/2021 08:06 AM    Glucose 95 03/17/2021 08:06 AM    BUN 20 03/17/2021 08:06 AM    Creatinine 1.04 (H) 03/17/2021 08:06 AM    BUN/Creatinine ratio 19 03/17/2021 08:06 AM    GFR est AA 57 (L) 03/17/2021 08:06 AM    GFR est non-AA 49 (L) 03/17/2021 08:06 AM    Calcium 9.4 03/17/2021 08:06 AM       Lab Results   Component Value Date/Time    Cholesterol, total 195 03/17/2021 08:06 AM    HDL Cholesterol 61 03/17/2021 08:06 AM    LDL-CHOLESTEROL 116 (H) 03/17/2021 08:06 AM    LDL, calculated 140 (H) 04/03/2014 12:00 AM    VLDL, calculated 27 04/03/2014 12:00 AM    Triglyceride 82 03/17/2021 08:06 AM    Cholesterol/HDL ratio 3.2 03/17/2021 08:06 AM       No results found for: HBA1C, AQB7VFTQ, DIB9FXTL    Assessment/Plan:   1. TIA/HLD: Her cholesterol improved per labs earlier this year  We will check labs in May 2022. We discussed the option of pharmacotherapy. She was to hold off at this time. 2. H/o Breast Cancer:  She declined surveillance studies. Checking labs in May 2022. Observation. 3.  Glaucoma: Stable. Continue to follow-up with the ophthalmologist for treatment recommendations. Continue medication as prescribed.     4.  Whitecoat hypertension:  I encouraged her to check her blood pressure at home for the next 2 weeks and notify me if all readings are above 140/90.    5.  General:  She declined getting her flu, tetanus, and shingles vaccinations. Health Maintenance Due   Topic Date Due    Shingrix Vaccine Age 49> (1 of 2) Never done    Flu Vaccine (1) Never done     Lab review: labs are reviewed in the EHR and ordered as mentioned above. I have discussed the diagnosis with the patient and the intended plan as seen in the above orders. The patient has received an after-visit summary and questions were answered concerning future plans. I have discussed medication side effects and warnings with the patient as well. I have reviewed the plan of care with the patient, accepted their input and they are in agreement with the treatment goals. All questions were answered. The patient understands the plan of care. Handouts provided today with above information. Pt instructed if symptoms worsen to call the office or report to the ED for continued care. Greater than 50% of the visit time was spent in counseling and/or coordination of care. Voice recognition was used to generate this report, which may have resulted in some phonetic based errors in grammar and contents. Even though attempts were made to correct all the mistakes, some may have been missed, and remained in the body of the document.           Tato Rose MD

## 2021-11-22 ENCOUNTER — TELEPHONE (OUTPATIENT)
Dept: INTERNAL MEDICINE CLINIC | Age: 86
End: 2021-11-22

## 2021-11-22 ENCOUNTER — VIRTUAL VISIT (OUTPATIENT)
Dept: INTERNAL MEDICINE CLINIC | Age: 86
End: 2021-11-22
Payer: OTHER GOVERNMENT

## 2021-11-22 DIAGNOSIS — J00 COMMON COLD: Primary | ICD-10-CM

## 2021-11-22 DIAGNOSIS — R05.9 COUGH: ICD-10-CM

## 2021-11-22 PROCEDURE — 99441 PR PHYS/QHP TELEPHONE EVALUATION 5-10 MIN: CPT | Performed by: INTERNAL MEDICINE

## 2021-11-22 RX ORDER — METHYLPREDNISOLONE 4 MG/1
TABLET ORAL
Qty: 1 DOSE PACK | Refills: 0 | Status: SHIPPED | OUTPATIENT
Start: 2021-11-22 | End: 2022-05-18 | Stop reason: ALTCHOICE

## 2021-11-22 NOTE — PROGRESS NOTES
Idalmis Luciano is a 80 y.o. female who was seen by synchronous (real-time) audio-phone only technology on 11/22/2021. Assessment & Plan:   Cough: Likely from allergic rhinitis and from a post infectious cough. - Medrol dose pack prescribed. - Ok to take tylenol prn pleuritic CP  - She was instructed to notify me if her sx do not improve with rx prescribed. At that time (on Wednesday), I would order a course of abx to cover for a bacterial respiratory tract infection. Lab review: labs are reviewed in the EHR     I have discussed the diagnosis with the patient and the intended plan as seen in the above orders. I have discussed medication side effects and warnings with the patient as well. I have reviewed the plan of care with the patient, accepted their input and they are in agreement with the treatment goals. All questions were answered. The patient understands the plan of care. Pt instructed if symptoms worsen to call the office or report to the ED for continued care. Greater than 50% of the visit time was spent in counseling and/or coordination of care. I spent 6 min with the pt for this phone only encounter. Voice recognition was used to generate this report, which may have resulted in some phonetic based errors in grammar and contents. Even though attempts were made to correct all the mistakes, some may have been missed, and remained in the body of the document. Subjective:   Idalmis Luciano was seen for   Chief Complaint   Patient presents with    Cough     The patient is an 49-year-old female with history of cerebral atrophy per head CT findings, white coat hypertension, urethral polyp seen on cystoscopy, microscopic hematuria, h/o breast cancer (declining further mammograms) s/p b/l mastectomy, glaucoma, chronic kidney disease stage III, history of TIA. Cough : She went to [facility] on 11/16/21. She was diagnosed with the common cold.  She was given rx for benzonatate and told to also take coricidin. Her cough has not improved. She was also given a nasal spray for sx relief. It's a \"dry hacking cough. \" Her ribs are hurting when she coughs. +Sneezing. No sick contacts. No SOB/fever. Prior to Admission medications    Medication Sig Start Date End Date Taking? Authorizing Provider   LUMIGAN 0.01 % ophthalmic drops Administer 1 Drop to right eye nightly. 8/21/19   Provider, Historical   RHOPRESSA 0.02 % drop INSTILL 1 DROP IN RIGHT EYE DAILY DINNER 7/20/18   Provider, Historical   propylene glycol/peg 400 (SYSTANE LIQUID GEL OP) Apply  to eye daily as needed. Provider, Historical   Calcium Carbonate-Vit D3-Min 600 mg calcium- 400 unit tab Take  by mouth every other day. Patient not taking: Reported on 9/8/2021    Provider, Historical   aspirin 81 mg CpDR Take 81 mg by mouth daily. Patient taking 2 81 mg tablets a day    Provider, Historical   multivitamin (ONE A DAY) tablet Take 1 Tab by mouth every other day.     Provider, Historical     Allergies   Allergen Reactions    Alphagan P [Brimonidine] Itching    Pravastatin Swelling and Myalgia     Swelling in the throat, tongue    Codeine Nausea and Vomiting    Darvocet A500 [Propoxyphene N-Acetaminophen] Nausea and Vomiting    Darvon [Propoxyphene] Nausea and Vomiting    Demerol [Meperidine] Nausea and Vomiting    Entex [Phenylephrine-Guaifenesin] Nausea and Vomiting    Erythromycin Nausea and Vomiting    Other Medication Shortness of Breath     No beta blockers    Sulfa (Sulfonamide Antibiotics) Nausea and Vomiting    Timolol Nausea and Vomiting    Vicodin [Hydrocodone-Acetaminophen] Other (comments)     confusion     Past Medical History:   Diagnosis Date    Breast CA [facility]) 5664/1705 1984 left, 1986 right    CKD (chronic kidney disease) stage 3, GFR 30-59 ml/min ([facility])     Glaucoma     TIA (transient ischemic attack) 2009     Past Surgical History:   Procedure Laterality Date    HX CATARACT REMOVAL Left 2006    HX HYSTERECTOMY  1977   Rd Patel MASTECTOMY  [phone]     Family History   Problem Relation Age of Onset    Cancer Mother         breast    Hypertension Mother     Arthritis-osteo Mother     Dementia Mother     Hypertension Father     Heart Disease Father     Arthritis-osteo Father     Hypertension Sister     Diabetes Sister     Cancer Brother         lung    Hypertension Brother     Heart Disease Brother      Social History     Socioeconomic History    Marital status:    Tobacco Use    Smoking status: Never Smoker    Smokeless tobacco: Never Used   Substance and Sexual Activity    Alcohol use: No     Alcohol/week: 0.0 standard drinks    Drug use: Yes     Types: Prescription, OTC    Sexual activity: Not Currently     Partners: Male   Social History Narrative    Activity level-walks 2 miles 1x week on Sunday    Engages in gardening, routine house chores without difficulty               ROS:  Gen: No fever/chills  HEENT: No sore throat, eye pain, ear pain, or congestion. No HA  CV: No CP  Resp: +Cough. No SOB.   GI: No abdominal pain  : No hematuria/dysuria  Derm: No rash  Neuro: No new paresthesias/weakness  Musc: No new myalgias/jt pain  Psych: No depression sx      LABS:  Lab Results   Component Value Date/Time    Sodium 143 03/17/2021 08:06 AM    Potassium 4.8 03/17/2021 08:06 AM    Chloride 106 03/17/2021 08:06 AM    CO2 30 03/17/2021 08:06 AM    Glucose 95 03/17/2021 08:06 AM    BUN 20 03/17/2021 08:06 AM    Creatinine 1.04 (H) 03/17/2021 08:06 AM    BUN/Creatinine ratio 19 03/17/2021 08:06 AM    GFR est AA 57 (L) 03/17/2021 08:06 AM    GFR est non-AA 49 (L) 03/17/2021 08:06 AM    Calcium 9.4 03/17/2021 08:06 AM       Lab Results   Component Value Date/Time    Cholesterol, total 195 03/17/2021 08:06 AM    HDL Cholesterol 61 03/17/2021 08:06 AM    LDL-CHOLESTEROL 116 (H) 03/17/2021 08:06 AM    LDL, calculated 140 (H) 04/03/2014 12:00 AM    VLDL, calculated 27 04/03/2014 12:00 AM Triglyceride 82 03/17/2021 08:06 AM    Cholesterol/HDL ratio 3.2 03/17/2021 08:06 AM       Lab Results   Component Value Date/Time    WBC 6.0 03/17/2021 08:06 AM    HGB 15.0 03/17/2021 08:06 AM    HCT 45.6 (H) 03/17/2021 08:06 AM    PLATELET 531 38/63/8186 08:06 AM    MCV 92.9 03/17/2021 08:06 AM       No results found for: HBA1C, [id number], [id number]    Lab Results   Component Value Date/Time    TSH 3.490 04/03/2014 12:00 AM           Due to this being a TeleHealth phone only evaluation, many elements of the physical examination are unable to be assessed. The pt was seen by synchronous (real-time) audio-phone only technology, and/or her healthcare decision maker, is aware that this patient-initiated, Telehealth encounter is a billable service, with coverage as determined by her insurance carrier. She is aware that she may receive a bill and has provided verbal consent to proceed: Yes. The pt is being evaluated by a phone only visit encounter for concerns as above. A caregiver was present when appropriate. Due to this being a TeleHealth encounter (During [facility] public health emergency), evaluation of the following organ systems was limited: Vitals/Constitutional/EENT/Resp/CV/GI//MS/Neuro/Skin/Heme-Lymph-Imm. Pursuant to the emergency declaration under the [address] [place] [address], [address] [place] [address] and the [employer] and [employer] General Act, this Virtual phone only Visit was conducted, with patient's (and/or legal guardian's) consent, to reduce the patient's risk of exposure to COVID-19 and provide necessary medical care. Services were provided through a phone only synchronous discussion virtually to substitute for in-person clinic visit. Patient and provider were located at their individual home/office respectively. We discussed the expected course, resolution and complications of the diagnosis(es) in detail.   Medication risks, benefits, costs, interactions, and alternatives were discussed as indicated. I advised her to contact the office if her condition worsens, changes or fails to improve as anticipated. She expressed understanding with the diagnosis(es) and plan.      Lyn Kendall MD

## 2021-11-22 NOTE — TELEPHONE ENCOUNTER
Please return call to pt re: virtual appt for cough/congestion since 11/16/21    [facility] and dx with common cold, was prescribed cough pills, and OTC Coricidin which has not helped. Stated has dry hacking cough that takes her breath.

## 2022-03-18 PROBLEM — R03.0 WHITE COAT SYNDROME WITHOUT HYPERTENSION: Status: ACTIVE | Noted: 2017-09-11

## 2022-03-19 PROBLEM — Z85.3 HISTORY OF BREAST CANCER: Status: ACTIVE | Noted: 2018-03-12

## 2022-05-06 LAB
ALB/GLOBRATIO, 58C: 1.7 (CALC) (ref 1–2.5)
ALBUMIN SERPL-MCNC: 4.3 G/DL (ref 3.6–5.1)
ALKALINE PHOSPHATASE, TOTAL, 25002000: 68 U/L (ref 37–153)
ALT SERPL-CCNC: 10 U/L (ref 6–29)
AST SERPL W P-5'-P-CCNC: 17 U/L (ref 10–35)
BASOPHILS # BLD: 58 CELLS/UL (ref 0–200)
BASOPHILS NFR BLD: 0.9 %
BILIRUB SERPL-MCNC: 0.6 MG/DL (ref 0.2–1.2)
BUN SERPL-MCNC: 24 MG/DL (ref 7–25)
BUN/CREATININE RATIO,BUCR: 21 (CALC) (ref 6–22)
CALCIUM SERPL-MCNC: 9.7 MG/DL (ref 8.6–10.4)
CHLORIDE SERPL-SCNC: 104 MMOL/L (ref 98–110)
CHOL/HDL RATIO,CHHDX: 3.4 (CALC)
CHOLEST SERPL-MCNC: 210 MG/DL
CO2 SERPL-SCNC: 31 MMOL/L (ref 20–32)
CREAT SERPL-MCNC: 1.16 MG/DL (ref 0.6–0.88)
EOSINOPHIL # BLD: 198 CELLS/UL (ref 15–500)
EOSINOPHIL NFR BLD: 3.1 %
ERYTHROCYTE [DISTWIDTH] IN BLOOD BY AUTOMATED COUNT: 12.9 % (ref 11–15)
GLOBULIN,GLOB: 2.5 G/DL (CALC) (ref 1.9–3.7)
GLUCOSE SERPL-MCNC: 89 MG/DL (ref 65–99)
HCT VFR BLD AUTO: 45.8 % (ref 35–45)
HDLC SERPL-MCNC: 62 MG/DL
HGB BLD-MCNC: 14.8 G/DL (ref 11.7–15.5)
LDL-CHOLESTEROL: 129 MG/DL (CALC)
LYMPHOCYTES # BLD: 2899 CELLS/UL (ref 850–3900)
LYMPHOCYTES NFR BLD: 45.3 %
MCH RBC QN AUTO: 29.7 PG (ref 27–33)
MCHC RBC AUTO-ENTMCNC: 32.3 G/DL (ref 32–36)
MCV RBC AUTO: 91.8 FL (ref 80–100)
MONOCYTES # BLD: 589 CELLS/UL (ref 200–950)
MONOCYTES NFR BLD: 9.2 %
NEUTROPHILS # BLD AUTO: 2656 CELLS/UL (ref 1500–7800)
NEUTROPHILS # BLD: 41.5 %
NON-HDL CHOLESTEROL, 011976: 148 MG/DL (CALC)
PLATELET # BLD AUTO: 283 THOUSAND/UL (ref 140–400)
PMV BLD AUTO: 10.7 FL (ref 7.5–12.5)
POTASSIUM SERPL-SCNC: 4.9 MMOL/L (ref 3.5–5.3)
PROT SERPL-MCNC: 6.8 G/DL (ref 6.1–8.1)
RBC # BLD AUTO: 4.99 MILLION/UL (ref 3.8–5.1)
SODIUM SERPL-SCNC: 140 MMOL/L (ref 135–146)
TRIGL SERPL-MCNC: 88 MG/DL (ref ?–150)
WBC # BLD AUTO: 6.4 THOUSAND/UL (ref 3.8–10.8)

## 2022-05-12 NOTE — PROGRESS NOTES
I will let her know at her upcoming appointment that her total cholesterol is up to 10 from 195 a year ago. HDL is 62. Triglycerides are 88. LDL is 129. Her CMP shows that her creatinine is at her baseline of 1.16. BUN is 24. Her CBC is unremarkable.     Dr. Vladislav Anderson  [facility], [address] [place] [address], [address] [place] [address] Str.  Phone: [phone]  Fax: [phone]

## 2022-05-18 ENCOUNTER — OFFICE VISIT (OUTPATIENT)
Dept: INTERNAL MEDICINE CLINIC | Age: 87
End: 2022-05-18
Payer: OTHER GOVERNMENT

## 2022-05-18 VITALS
WEIGHT: 119 LBS | RESPIRATION RATE: 12 BRPM | HEART RATE: 77 BPM | SYSTOLIC BLOOD PRESSURE: 137 MMHG | TEMPERATURE: 97.9 F | OXYGEN SATURATION: 100 % | HEIGHT: 63 IN | DIASTOLIC BLOOD PRESSURE: 49 MMHG | BODY MASS INDEX: 21.09 KG/M2

## 2022-05-18 DIAGNOSIS — R03.0 WHITE COAT SYNDROME WITHOUT HYPERTENSION: ICD-10-CM

## 2022-05-18 DIAGNOSIS — Z85.3 HISTORY OF BREAST CANCER: ICD-10-CM

## 2022-05-18 DIAGNOSIS — H40.9 GLAUCOMA, UNSPECIFIED GLAUCOMA TYPE, UNSPECIFIED LATERALITY: ICD-10-CM

## 2022-05-18 DIAGNOSIS — E78.5 HYPERLIPIDEMIA, UNSPECIFIED HYPERLIPIDEMIA TYPE: Primary | ICD-10-CM

## 2022-05-18 PROCEDURE — 1090F PRES/ABSN URINE INCON ASSESS: CPT | Performed by: INTERNAL MEDICINE

## 2022-05-18 PROCEDURE — G8536 NO DOC ELDER MAL SCRN: HCPCS | Performed by: INTERNAL MEDICINE

## 2022-05-18 PROCEDURE — G8510 SCR DEP NEG, NO PLAN REQD: HCPCS | Performed by: INTERNAL MEDICINE

## 2022-05-18 PROCEDURE — 99214 OFFICE O/P EST MOD 30 MIN: CPT | Performed by: INTERNAL MEDICINE

## 2022-05-18 PROCEDURE — G8427 DOCREV CUR MEDS BY ELIG CLIN: HCPCS | Performed by: INTERNAL MEDICINE

## 2022-05-18 PROCEDURE — 1101F PT FALLS ASSESS-DOCD LE1/YR: CPT | Performed by: INTERNAL MEDICINE

## 2022-05-18 PROCEDURE — G8420 CALC BMI NORM PARAMETERS: HCPCS | Performed by: INTERNAL MEDICINE

## 2022-05-18 NOTE — PROGRESS NOTES
INTERNISTS OF [facility]:  5/18/2022, MRN: [id number]      Maren Kumar is a 80 y.o. female and presents to clinic for No chief complaint on file. Subjective: The patient is an 71-year-old female with history of cerebral atrophy per head CT findings, white coat hypertension, urethral polyp seen on cystoscopy, microscopic hematuria, h/o breast cancer (declining further mammograms) s/p b/l mastectomy, glaucoma, chronic kidney disease stage III, history of TIA. 1.  Hyperlipidemia: Her most recent labs show: Her total cholesterol is up to 10 from 195 a year ago. HDL is 62. Triglycerides are 88. LDL is 129. She has a history of intolerance to pravastatin. She is taking aspirin, given her history of TIA. \"I'd rather not go with any further medications. \"    2.  Glaucoma: Vision: \"fine. \" Eye pain: none. She continues to see her ophthalmologist every 4 months. Her next appointment with her ophthalmologist: July. She continues to use Rhopressa drops. She can't afford her lumigan drops. 3.  History of breast cancer: In remission. She continues to decline breast cancer screening studies. No breast pain or masses. 4. White coat HTN: BP today: WNL. Home blood pressures are under 140/90 asymptomatic.           Patient Active Problem List    Diagnosis Date Noted    History of breast cancer 03/12/2018    White coat syndrome without hypertension 09/11/2017    Urethral polyp - seen on cytoscopy (thought to be the source of her microscopic hematuria) 12/27/2016    Microscopic hematuria 12/27/2016    Glaucoma 07/16/2013    H/O TIA (transient ischemic attack) and stroke 06/05/2013       Current Outpatient Medications   Medication Sig Dispense Refill    methylPREDNISolone (MEDROL DOSEPACK) 4 mg tablet Take per package instructions 1 Dose Pack 0    LUMIGAN 0.01 % ophthalmic drops Administer 1 Drop to right eye nightly.       RHOPRESSA 0.02 % drop INSTILL 1 DROP IN RIGHT EYE DAILY DINNER  6    propylene glycol/peg 400 (SYSTANE LIQUID GEL OP) Apply  to eye daily as needed.  Calcium Carbonate-Vit D3-Min 600 mg calcium- 400 unit tab Take  by mouth every other day. (Patient not taking: Reported on 9/8/2021)      aspirin 81 mg CpDR Take 81 mg by mouth daily. Patient taking 2 81 mg tablets a day      multivitamin (ONE A DAY) tablet Take 1 Tab by mouth every other day.          Allergies   Allergen Reactions    Alphagan P [Brimonidine] Itching    Pravastatin Swelling and Myalgia     Swelling in the throat, tongue    Codeine Nausea and Vomiting    Darvocet A500 [Propoxyphene N-Acetaminophen] Nausea and Vomiting    Darvon [Propoxyphene] Nausea and Vomiting    Demerol [Meperidine] Nausea and Vomiting    Entex [Phenylephrine-Guaifenesin] Nausea and Vomiting    Erythromycin Nausea and Vomiting    Other Medication Shortness of Breath     No beta blockers    Sulfa (Sulfonamide Antibiotics) Nausea and Vomiting    Timolol Nausea and Vomiting    Vicodin [Hydrocodone-Acetaminophen] Other (comments)     confusion       Past Medical History:   Diagnosis Date    Breast [facility]) [id number]    1984 left, 1986 right    CKD (chronic kidney disease) stage 3, GFR 30-59 ml/min ([facility])     Glaucoma     TIA (transient ischemic attack) 2009       Past Surgical History:   Procedure Laterality Date    HX CATARACT REMOVAL Left 2006    HX HYSTERECTOMY  1977   Mir Ano MASTECTOMY  [id number]       Family History   Problem Relation Age of Onset    Cancer Mother         breast    Hypertension Mother     OSTEOARTHRITIS Mother     Dementia Mother     Hypertension Father     Heart Disease Father     OSTEOARTHRITIS Father     Hypertension Sister     Diabetes Sister     Cancer Brother         lung    Hypertension Brother     Heart Disease Brother        Social History     Tobacco Use    Smoking status: Never Smoker    Smokeless tobacco: Never Used   Substance Use Topics    Alcohol use: No     Alcohol/week: 0.0 standard drinks ROS   Review of Systems   Constitutional: Negative for chills and fever. HENT: Negative for ear pain and sore throat. Eyes: Negative for blurred vision and pain. Respiratory: Negative for cough and shortness of breath. Cardiovascular: Negative for chest pain. Gastrointestinal: Negative for abdominal pain, blood in stool and melena. Genitourinary: Negative for dysuria and hematuria. Musculoskeletal: Negative for joint pain and myalgias. Skin: Negative for rash. Neurological: Negative for headaches. Endo/Heme/Allergies: Does not bruise/bleed easily. Psychiatric/Behavioral: Negative for substance abuse. Objective     There were no vitals filed for this visit. Physical Exam  Vitals and nursing note reviewed. HENT:      Head: Normocephalic and atraumatic. Right Ear: External ear normal.      Left Ear: External ear normal.   Eyes:      General: No scleral icterus. Right eye: No discharge. Left eye: No discharge. Extraocular Movements: Extraocular movements intact. Cardiovascular:      Rate and Rhythm: Normal rate and regular rhythm. Heart sounds: Normal heart sounds. No murmur heard. No friction rub. No gallop. Pulmonary:      Effort: Pulmonary effort is normal. No respiratory distress. Breath sounds: Normal breath sounds. No wheezing or rales. Abdominal:      General: Bowel sounds are normal. There is no distension. Palpations: Abdomen is soft. There is no mass. Tenderness: There is no abdominal tenderness. There is no guarding or rebound. Musculoskeletal:         General: No swelling (BUE) or tenderness (BUE). Cervical back: Neck supple. Lymphadenopathy:      Cervical: No cervical adenopathy. Skin:     General: Skin is warm and dry. Findings: No erythema or rash. Neurological:      Mental Status: She is alert. Motor: No abnormal muscle tone.       Gait: Gait normal.   Psychiatric:         Mood and Affect: Mood normal.         LABS   Data Review:   Lab Results   Component Value Date/Time    WBC 6.4 05/05/2022 08:46 AM    HGB 14.8 05/05/2022 08:46 AM    HCT 45.8 (H) 05/05/2022 08:46 AM    PLATELET 199 93/22/1116 08:46 AM    MCV 91.8 05/05/2022 08:46 AM       Lab Results   Component Value Date/Time    Sodium 140 05/05/2022 08:46 AM    Potassium 4.9 05/05/2022 08:46 AM    Chloride 104 05/05/2022 08:46 AM    CO2 31 05/05/2022 08:46 AM    Glucose 89 05/05/2022 08:46 AM    BUN 24 05/05/2022 08:46 AM    Creatinine 1.16 (H) 05/05/2022 08:46 AM    BUN/Creatinine ratio 21 05/05/2022 08:46 AM    GFR est AA 49 (L) 05/05/2022 08:46 AM    GFR est non-AA 43 (L) 05/05/2022 08:46 AM    Calcium 9.7 05/05/2022 08:46 AM       Lab Results   Component Value Date/Time    Cholesterol, total 210 (H) 05/05/2022 08:46 AM    HDL Cholesterol 62 05/05/2022 08:46 AM    LDL-CHOLESTEROL 129 (H) 05/05/2022 08:46 AM    LDL, calculated 140 (H) 04/03/2014 12:00 AM    VLDL, calculated 27 04/03/2014 12:00 AM    Triglyceride 88 05/05/2022 08:46 AM    Cholesterol/HDL ratio 3.4 05/05/2022 08:46 AM       No results found for: HBA1C, [id number], NOG2OPYO    Assessment/Plan:   1. Hyperlipidemia: She declined medication at this time for hyperlipidemia. [facility] labs in September.  I encouraged her to increase her intake of omega-3 rich foods.  I encouraged her to exercise by walking more. I discussed the importance of maintaining her weight and not losing any weight as her BMI is normal.    ORDERS:  - LIPID PANEL; Future  - METABOLIC PANEL, COMPREHENSIVE; Future    2. Glaucoma:   Continue with prescription eyedrops per her ophthalmologist.  Laverne Obregon I encouraged her to continue with IOP checks per her ophthalmologist.    3. H/o Breast Cancer: In remission.  She continues to decline mammograms. We will proceed with observation. 4.  Whitecoat hypertension: Her BP today is reassuring.  I encouraged her to continue with home BP checks.       Health Maintenance Due   Topic Date Due    Shingrix Vaccine Age 49> (1 of 2) Never done    COVID-19 Vaccine (3 - Booster for Pfizer series) 12/27/2021     Lab review: labs are reviewed in the EHR and ordered mention above. I have discussed the diagnosis with the patient and the intended plan as seen in the above orders. The patient has received an after-visit summary and questions were answered concerning future plans. I have discussed medication side effects and warnings with the patient as well. I have reviewed the plan of care with the patient, accepted their input and they are in agreement with the treatment goals. All questions were answered. The patient understands the plan of care. Handouts provided today with above information. Pt instructed if symptoms worsen to call the office or report to the ED for continued care. Greater than 50% of the visit time was spent in counseling and/or coordination of care. Voice recognition was used to generate this report, which may have resulted in some phonetic based errors in grammar and contents. Even though attempts were made to correct all the mistakes, some may have been missed, and remained in the body of the document.           Nicole Box MD

## 2022-05-18 NOTE — PROGRESS NOTES
Jenny Wilson presents today for   Chief Complaint   Patient presents with   [address]     5-5-22         1. \"Have you been to the ER, urgent care clinic since your last visit? Hospitalized since your last visit? \" NO    2. \"Have you seen or consulted any other health care providers outside of the [address] [place] [address] since your last visit? \" YES     3. For patients aged 39-70: Has the patient had a colonoscopy / FIT/ Cologuard? NA - based on age      If the patient is female:    4. For patients aged 41-77: Has the patient had a mammogram within the past 2 years? NA - based on age or sex  See top three    5. For patients aged 21-65: Has the patient had a pap smear?  NA - based on age or sex

## 2022-05-18 NOTE — PATIENT INSTRUCTIONS
Results for Giuseppe Albright (MRN [id number]) as of 5/18/2022 09:26   Ref. Range 5/5/2022 08:46   WBC Latest Ref Range: 3.8 - 10.8 Thousand/uL 6.4   RBC Latest Ref Range: 3.80 - 5.10 Million/uL 4.99   HGB Latest Ref Range: 11.7 - 15.5 g/dL 14.8   HCT Latest Ref Range: 35.0 - 45.0 % 45.8 (H)   MCV Latest Ref Range: 80.0 - 100.0 fL 91.8   MCH Latest Ref Range: 27.0 - 33.0 pg 29.7   MCHC Latest Ref Range: 32.0 - 36.0 g/dL 32.3   RDW Latest Ref Range: 11.0 - 15.0 % 12.9   PLATELET Latest Ref Range: 140 - 400 Thousand/uL 283   MEAN PLATELET VOLUME Latest Ref Range: 7.5 - 12.5 fL 10.7   LYMPHOCYTES Latest Units: % 45.3   MONOCYTES Latest Units: % 9.2   EOSINOPHILS Latest Units: % 3.1   BASOPHILS Latest Units: % 0.9   ABS. NEUTROPHILS Latest Ref Range: 1,500 - 7,800 cells/uL 2,656   ABS. LYMPHOCYTES Latest Ref Range: 850 - 3,900 cells/uL 2,899   ABS. MONOCYTES Latest Ref Range: 200 - 950 cells/uL 589   ABS. EOSINOPHILS Latest Ref Range: 15 - 500 cells/uL 198   ABS.  BASOPHILS Latest Ref Range: 0 - 200 cells/uL 58   Sodium Latest Ref Range: 135 - 146 mmol/L 140   Potassium Latest Ref Range: 3.5 - 5.3 mmol/L 4.9   Chloride Latest Ref Range: 98 - 110 mmol/L 104   CO2 Latest Ref Range: 20 - 32 mmol/L 31   Glucose Latest Ref Range: 65 - 99 mg/dL 89   BUN Latest Ref Range: 7 - 25 mg/dL 24   Creatinine Latest Ref Range: 0.60 - 0.88 mg/dL 1.16 (H)   BUN/Creatinine ratio Latest Ref Range: 6 - 22 (calc) 21   Calcium Latest Ref Range: 8.6 - 10.4 mg/dL 9.7   GFR est non-AA Latest Ref Range: > OR = 60 mL/min/1.73m2 43 (L)   GFR est AA Latest Ref Range: > OR = 60 mL/min/1.73m2 49 (L)   Bilirubin, total Latest Ref Range: 0.2 - 1.2 mg/dL 0.6   Protein, total Latest Ref Range: 6.1 - 8.1 g/dL 6.8   Albumin Latest Ref Range: 3.6 - 5.1 g/dL 4.3   Globulin Latest Ref Range: 1.9 - 3.7 g/dL (calc) 2.5   ALT Latest Ref Range: 6 - 29 U/L 10   AST Latest Ref Range: 10 - 35 U/L 17   Triglyceride Latest Ref Range: <150 mg/dL 88   Cholesterol, total Latest Ref Range: <200 mg/dL 210 (H)   HDL Cholesterol Latest Ref Range: > OR = 50 mg/dL 62   Non-HDL Cholesterol Latest Ref Range: <130 mg/dL (calc) 148 (H)   LDL-CHOLESTEROL Latest Units: mg/dL (calc) 129 (H)   Cholesterol/HDL ratio Latest Ref Range: <5.0 (calc) 3.4   Alkaline Phosphatase, total Latest Ref Range: 37 - 153 U/L 68        Home Blood Pressure Test: About This Test  What is it? A home blood pressure test allows you to keep track of your blood pressure at home. Blood pressure is a measure of the force of blood against the walls of your arteries. Blood pressure readings include two numbers, such as 130/80 (say \"130 over 80\"). The first number is the systolic pressure. The second number is the diastolic pressure. Why is this test done? You may do this test at home to:  · Find out if you have high blood pressure. · Track your blood pressure if you have high blood pressure. · Track how well medicine is working to reduce high blood pressure. · Check how lifestyle changes, such as weight loss and exercise, are affecting blood pressure. How do you prepare for the test?  For at least 30 minutes before you take your blood pressure, don't exercise, drink caffeine, or smoke. Empty your bladder before the test. Sit quietly with your back straight and both feet on the floor for at least 5 minutes. This helps you take your blood pressure while you feel comfortable and relaxed. How is the test done? · If your doctor recommends it, take your blood pressure twice a day. Take it in the morning and evening. · Sit with your arm slightly bent and resting on a table so that your upper arm is at the same level as your heart. · Use the same arm each time you take your blood pressure. · Place the blood pressure cuff on the bare skin of your upper arm. You may have to roll up your sleeve, remove your arm from the sleeve, or take your shirt off.   · Wrap the blood pressure cuff around your upper arm so that the lower edge of the cuff is about 1 inch above the bend of your elbow. · Do not move, talk, or text while you take your blood pressure. Follow the instructions that came with your blood pressure monitor. They might be different from the following. · Press the on/off button on the automatic monitor. Then you may need to wait until the screen says the monitor is ready. · Press the start button. The cuff will inflate and deflate by itself. · Your blood pressure numbers will appear on the screen. · Wait one minute and take your blood pressure again. · If your monitor does not automatically save your numbers, write them in your log book, along with the date and time. Follow-up care is a key part of your treatment and safety. Be sure to make and go to all appointments, and call your doctor if you are having problems. It's also a good idea to keep a list of the medicines you take. Where can you learn more? Go to http://www.mccord.com/  Enter C427 in the search box to learn more about \"Home Blood Pressure Test: About This Test.\"  Current as of: January 10, 2022               Content Version: 13.2  © 9117-3702 [employer]. Care instructions adapted under license by [employer] (which disclaims liability or warranty for this information). If you have questions about a medical condition or this instruction, always ask your healthcare professional. [address] any warranty or liability for your use of this information. High Cholesterol: Care Instructions  Overview     Cholesterol is a type of fat in your blood. It is needed for many body functions, such as making new cells. Cholesterol is made by your body. It also comes from food you eat. High cholesterol means that you have too much of the fat in your blood. This raises your risk of a heart attack and stroke. LDL and HDL are part of your total cholesterol. LDL is the \"bad\" cholesterol. High LDL can raise your risk for coronary artery disease, heart attack, and stroke. HDL is the \"good\" cholesterol. It helps clear bad cholesterol from the body. High HDL is linked with a lower risk of coronary artery disease, heart attack, and stroke. Your cholesterol levels help your doctor find out your risk for having a heart attack or stroke. You and your doctor can talk about whether you need to lower your risk and what treatment is best for you. Treatment options include a heart-healthy lifestyle and medicine. Both options can help lower your cholesterol and your risk. The way you choose to lower your risk will depend on how high your risk is for heart attack and stroke. It will also depend on how you feel about taking medicines. Follow-up care is a key part of your treatment and safety. Be sure to make and go to all appointments, and call your doctor if you are having problems. It's also a good idea to know your test results and keep a list of the medicines you take. How can you care for yourself at home? · Eat heart-healthy foods. ? Eat fruits, vegetables, whole grains, beans, and other high-fiber foods. ? Eat lean proteins, such as seafood, lean meats, beans, nuts, and soy products. ? Eat healthy fats, such as canola and olive oil. ? Choose foods that are low in saturated fat. ? Limit sodium and alcohol. ? Limit drinks and foods with added sugar. · Be physically active. Try to do moderate activity at least 2½ hours a week. Or try to do vigorous activity at least 1¼ hours a week. You may want to walk or try other activities, such as running, swimming, cycling, or playing tennis or team sports. · Stay at a healthy weight or lose weight by making the changes in eating and physical activity listed above. Losing just a small amount of weight, even 5 to 10 pounds, can help reduce your risk for having a heart attack or stroke. · Do not smoke. · Manage other health problems.  These include diabetes and high blood pressure. If you think you may have a problem with alcohol or drug use, talk to your doctor. · If you take medicine, take it exactly as prescribed. Call your doctor if you think you are having a problem with your medicine. · Check with your doctor or pharmacist before you use any other medicines, including over-the-counter medicines. Make sure your doctor knows all of the medicines, vitamins, herbal products, and supplements you take. Taking some medicines together can cause problems. When should you call for help? Watch closely for changes in your health, and be sure to contact your doctor if:    · You need help making lifestyle changes.     · You have questions about your medicine. Where can you learn more? Go to http://www.gray.com/  Enter D656 in the search box to learn more about \"High Cholesterol: Care Instructions. \"  Current as of: January 10, 2022               Content Version: 13.2  © 2006-2022 [employer]. Care instructions adapted under license by [employer] (which disclaims liability or warranty for this information). If you have questions about a medical condition or this instruction, always ask your healthcare professional. [place] [address] any warranty or liability for your use of this information.

## 2022-09-01 LAB
ALB/GLOBRATIO, 58C: 1.8 (CALC) (ref 1–2.5)
ALBUMIN SERPL-MCNC: 4.3 G/DL (ref 3.6–5.1)
ALKALINE PHOSPHATASE, TOTAL, 25002000: 62 U/L (ref 37–153)
ALT SERPL-CCNC: 12 U/L (ref 6–29)
AST SERPL W P-5'-P-CCNC: 19 U/L (ref 10–35)
BILIRUB SERPL-MCNC: 0.8 MG/DL (ref 0.2–1.2)
BUN SERPL-MCNC: 24 MG/DL (ref 7–25)
BUN/CREATININE RATIO,BUCR: 20 (CALC) (ref 6–22)
CALCIUM SERPL-MCNC: 9.9 MG/DL (ref 8.6–10.4)
CHLORIDE SERPL-SCNC: 103 MMOL/L (ref 98–110)
CHOL/HDL RATIO,CHHDX: 3.5 (CALC)
CHOLEST SERPL-MCNC: 202 MG/DL
CO2 SERPL-SCNC: 29 MMOL/L (ref 20–32)
CREAT SERPL-MCNC: 1.23 MG/DL (ref 0.6–0.95)
EGFR: 43 ML/MIN/1.73M2
GLOBULIN,GLOB: 2.4 G/DL (CALC) (ref 1.9–3.7)
GLUCOSE SERPL-MCNC: 84 MG/DL (ref 65–99)
HDLC SERPL-MCNC: 58 MG/DL
LDL-CHOLESTEROL: 127 MG/DL (CALC)
NON-HDL CHOLESTEROL, 011976: 144 MG/DL (CALC)
POTASSIUM SERPL-SCNC: 4.9 MMOL/L (ref 3.5–5.3)
PROT SERPL-MCNC: 6.7 G/DL (ref 6.1–8.1)
SODIUM SERPL-SCNC: 139 MMOL/L (ref 135–146)
TRIGL SERPL-MCNC: 77 MG/DL (ref ?–150)

## 2022-09-01 NOTE — PROGRESS NOTES
I will let her know at her upcoming appointment that her total cholesterol is down to 202 from 210. Her HDL is 58. Triglycerides are 77. Her LDL is 127. Overall, her cholesterol remains unchanged. Meanwhile, her most recent labs also show that her creatinine is 1.23. BUN is unchanged at 24.     Dr. Schmid Friday  [facility], [address] [place] [address], [facility] [address]  Phone: [phone]  Fax: [phone]

## 2022-09-13 NOTE — PROGRESS NOTES
Cici Valladares presents today for   Chief Complaint   Patient presents with    Labs     9-1-2022    Follow-up           1. \"Have you been to the ER, urgent care clinic since your last visit? Hospitalized since your last visit? \" No     2. \"Have you seen or consulted any other health care providers outside of the [address] [place] [address] since your last visit? \" Yes     3. For patients aged 39-70: Has the patient had a colonoscopy / FIT/ Cologuard? NA - based on age      If the patient is female:    4. For patients aged 41-77: Has the patient had a mammogram within the past 2 years? NA - based on age or sex  See top three    5. For patients aged 21-65: Has the patient had a pap smear?  NA - based on age or sex

## 2022-09-14 ENCOUNTER — TELEPHONE (OUTPATIENT)
Dept: INTERNAL MEDICINE CLINIC | Age: 87
End: 2022-09-14

## 2022-09-14 ENCOUNTER — OFFICE VISIT (OUTPATIENT)
Dept: INTERNAL MEDICINE CLINIC | Age: 87
End: 2022-09-14
Payer: OTHER GOVERNMENT

## 2022-09-14 VITALS
WEIGHT: 115.8 LBS | OXYGEN SATURATION: 97 % | BODY MASS INDEX: 20.52 KG/M2 | RESPIRATION RATE: 17 BRPM | HEIGHT: 63 IN | HEART RATE: 87 BPM | SYSTOLIC BLOOD PRESSURE: 141 MMHG | DIASTOLIC BLOOD PRESSURE: 50 MMHG | TEMPERATURE: 98.6 F

## 2022-09-14 DIAGNOSIS — R03.0 WHITE COAT SYNDROME WITHOUT HYPERTENSION: ICD-10-CM

## 2022-09-14 DIAGNOSIS — E78.5 HYPERLIPIDEMIA, UNSPECIFIED HYPERLIPIDEMIA TYPE: Primary | ICD-10-CM

## 2022-09-14 DIAGNOSIS — Z85.3 HISTORY OF BREAST CANCER: ICD-10-CM

## 2022-09-14 PROCEDURE — G8536 NO DOC ELDER MAL SCRN: HCPCS | Performed by: INTERNAL MEDICINE

## 2022-09-14 PROCEDURE — G8510 SCR DEP NEG, NO PLAN REQD: HCPCS | Performed by: INTERNAL MEDICINE

## 2022-09-14 PROCEDURE — 99214 OFFICE O/P EST MOD 30 MIN: CPT | Performed by: INTERNAL MEDICINE

## 2022-09-14 PROCEDURE — 1090F PRES/ABSN URINE INCON ASSESS: CPT | Performed by: INTERNAL MEDICINE

## 2022-09-14 PROCEDURE — G8427 DOCREV CUR MEDS BY ELIG CLIN: HCPCS | Performed by: INTERNAL MEDICINE

## 2022-09-14 PROCEDURE — 1123F ACP DISCUSS/DSCN MKR DOCD: CPT | Performed by: INTERNAL MEDICINE

## 2022-09-14 PROCEDURE — 1101F PT FALLS ASSESS-DOCD LE1/YR: CPT | Performed by: INTERNAL MEDICINE

## 2022-09-14 PROCEDURE — G8420 CALC BMI NORM PARAMETERS: HCPCS | Performed by: INTERNAL MEDICINE

## 2022-09-14 NOTE — PROGRESS NOTES
INTERNISTS OF [facility]:  9/21/2022, MRN: [id number]      Andrew Winston is a 80 y.o. female and presents to clinic for Labs (9-1-2022) and Follow-up      Subjective: The patient is an 45-year-old female with history of cerebral atrophy per head CT findings, white coat hypertension, urethral polyp seen on cystoscopy, microscopic hematuria, h/o breast cancer (declining further mammograms) s/p b/l mastectomy, glaucoma, chronic kidney disease stage III, history of TIA. 1. HLD: Her most recent labs show: Her total cholesterol is down to 202 from 210. Her HDL is 58. Triglycerides are 77. Her LDL is 127. Overall, her cholesterol remains unchanged. +H/o intolerance to statin rx. +ASA given her h/o TIA. She declines additional HLD based rx. She admits to eating shrimp and fish since her last apt. Her weight is down to 115lbs. 2. White coat HTN: She continues to check her BP at home. Readings are <140/90. BP is 141/50 today. Her recent labs show that her creatinine is at her baseline - 1.23. BUN is unchanged at 24.     3. H/o Breast Cancer: In remission. No new sx or findings. She does not want to pursue additional breast cancer surveillance studies at this time. Patient Active Problem List    Diagnosis Date Noted    History of breast cancer 03/12/2018    White coat syndrome without hypertension 09/11/2017    Urethral polyp - seen on cytoscopy (thought to be the source of her microscopic hematuria) 12/27/2016    Microscopic hematuria 12/27/2016    Glaucoma 07/16/2013    H/O TIA (transient ischemic attack) and stroke 06/05/2013       Current Outpatient Medications   Medication Sig Dispense Refill    LUMIGAN 0.01 % ophthalmic drops Administer 1 Drop to right eye nightly. RHOPRESSA 0.02 % drop INSTILL 1 DROP IN RIGHT EYE DAILY DINNER  6    propylene glycol/peg 400 (SYSTANE LIQUID GEL OP) Apply  to eye daily as needed. aspirin 81 mg CpDR Take 81 mg by mouth daily.  Patient taking 2 81 mg tablets a day Calcium Carbonate-Vit D3-Min 600 mg calcium- 400 unit tab Take  by mouth every other day. (Patient not taking: No sig reported)      multivitamin (ONE A DAY) tablet Take 1 Tab by mouth every other day. (Patient not taking: No sig reported)         Allergies   Allergen Reactions    Alphagan P [Brimonidine] Itching    Pravastatin Swelling and Myalgia     Swelling in the throat, tongue    Codeine Nausea and Vomiting    Darvocet A500 [Propoxyphene N-Acetaminophen] Nausea and Vomiting    Darvon [Propoxyphene] Nausea and Vomiting    Demerol [Meperidine] Nausea and Vomiting    Entex [Phenylephrine-Guaifenesin] Nausea and Vomiting    Erythromycin Nausea and Vomiting    Other Medication Shortness of Breath     No beta blockers    Sulfa (Sulfonamide Antibiotics) Nausea and Vomiting    Timolol Nausea and Vomiting    Vicodin [Hydrocodone-Acetaminophen] Other (comments)     confusion       Past Medical History:   Diagnosis Date    Breast CA [facility]) 0185/8670    1984 left, 1986 right    CKD (chronic kidney disease) stage 3, GFR 30-59 ml/min ([facility])     Glaucoma     TIA (transient ischemic attack) 2009       Past Surgical History:   Procedure Laterality Date    HX CATARACT REMOVAL Left 2006    HX HYSTERECTOMY  1977    HX MASTECTOMY  8467,3430       Family History   Problem Relation Age of Onset    Cancer Mother         breast    Hypertension Mother     OSTEOARTHRITIS Mother     Dementia Mother     Hypertension Father     Heart Disease Father     OSTEOARTHRITIS Father     Hypertension Sister     Diabetes Sister     Cancer Brother         lung    Hypertension Brother     Heart Disease Brother        Social History     Tobacco Use    Smoking status: Never    Smokeless tobacco: Never   Substance Use Topics    Alcohol use: No     Alcohol/week: 0.0 standard drinks       ROS   Review of Systems   Constitutional:  Negative for chills and fever. HENT:  Negative for ear pain and sore throat. Eyes:  Negative for pain.    Respiratory: Negative for cough and shortness of breath. Cardiovascular:  Negative for chest pain. Gastrointestinal:  Negative for abdominal pain, blood in stool and melena. Genitourinary:  Negative for dysuria and hematuria. Musculoskeletal:  Positive for back pain (unchanged) and joint pain (hip and knee pain - chronic). Negative for myalgias. Skin:  Negative for rash. Neurological:  Negative for headaches. Endo/Heme/Allergies:  Does not bruise/bleed easily. Psychiatric/Behavioral:  Negative for substance abuse. Objective     Vitals:    09/14/22 0927 09/14/22 0930   BP: (!) 153/46 (!) 141/50   Pulse: 85 87   Resp: 16 17   Temp: 98.6 °F (37 °C)    TempSrc: Temporal    SpO2: 97% 97%   Weight: 115 lb 12.8 oz (52.5 kg)    Height: 5' 3\" (1.6 m)    PainSc:   0 - No pain        Physical Exam  Vitals and nursing note reviewed. HENT:      Head: Normocephalic and atraumatic. Right Ear: External ear normal.      Left Ear: External ear normal.   Eyes:      General: No scleral icterus. Right eye: No discharge. Left eye: No discharge. Conjunctiva/sclera: Conjunctivae normal.   Cardiovascular:      Rate and Rhythm: Normal rate and regular rhythm. Heart sounds: Normal heart sounds. No murmur heard. No friction rub. No gallop. Pulmonary:      Effort: Pulmonary effort is normal. No respiratory distress. Breath sounds: Normal breath sounds. No wheezing or rales. Chest:      Chest wall: No tenderness. Abdominal:      General: Bowel sounds are normal. There is no distension. Palpations: Abdomen is soft. There is no mass. Tenderness: There is no abdominal tenderness. There is no guarding or rebound. Musculoskeletal:         General: No swelling (BUE) or tenderness (BUE). Cervical back: Neck supple. Lymphadenopathy:      Cervical: No cervical adenopathy. Skin:     General: Skin is warm and dry. Findings: No erythema or rash.    Neurological:      Mental Status: She is alert. Motor: No abnormal muscle tone. Gait: Gait normal.   Psychiatric:         Mood and Affect: Mood normal.       LABS   Data Review:   Lab Results   Component Value Date/Time    WBC 6.4 05/05/2022 08:46 AM    HGB 14.8 05/05/2022 08:46 AM    HCT 45.8 (H) 05/05/2022 08:46 AM    PLATELET 128 48/83/2625 08:46 AM    MCV 91.8 05/05/2022 08:46 AM       Lab Results   Component Value Date/Time    Sodium 139 08/31/2022 11:12 AM    Potassium 4.9 08/31/2022 11:12 AM    Chloride 103 08/31/2022 11:12 AM    CO2 29 08/31/2022 11:12 AM    Glucose 84 08/31/2022 11:12 AM    BUN 24 08/31/2022 11:12 AM    Creatinine 1.23 (H) 08/31/2022 11:12 AM    BUN/Creatinine ratio 20 08/31/2022 11:12 AM    GFR est AA 49 (L) 05/05/2022 08:46 AM    GFR est non-AA 43 (L) 05/05/2022 08:46 AM    Calcium 9.9 08/31/2022 11:12 AM       Lab Results   Component Value Date/Time    Cholesterol, total 202 (H) 08/31/2022 11:12 AM    HDL Cholesterol 58 08/31/2022 11:12 AM    LDL-CHOLESTEROL 127 (H) 08/31/2022 11:12 AM    LDL, calculated 140 (H) 04/03/2014 12:00 AM    VLDL, calculated 27 04/03/2014 12:00 AM    Triglyceride 77 08/31/2022 11:12 AM    Cholesterol/HDL ratio 3.5 08/31/2022 11:12 AM       No results found for: HBA1C, QOR3AULT, JWO3UKKY, VLQ1SNGX    Assessment/Plan:   1. HLD: LDL is 127. - Checking labs in 6 months.   - Continue with dietary measures, avoiding shellfish. - She declines HLD based rx. 2. White Coat HTN: BP is falsely elevated today  - I encouraged her to c/w home BP checks  - She is to notify me if readings are persistently greater than 140/90.     3. H/o Breast Cancer: In remission.  - Checking labs in 6 months. ICD-10-CM ICD-9-CM    1. Hyperlipidemia, unspecified hyperlipidemia type  E78.5 272.4 LIPID PANEL      METABOLIC PANEL, COMPREHENSIVE      2. History of breast cancer  Z85.3 V10.3 CBC WITH AUTOMATED DIFF      3.  White coat syndrome without hypertension  R03.0 796.2               Health Maintenance Due   Topic Date Due    Shingrix Vaccine Age 49> (1 of 2) Never done    COVID-19 Vaccine (4 - Booster for Pfizer series) 05/27/2022    Flu Vaccine (1) Never done         Lab review: labs are reviewed in the EHR and ordered as mentioned above    I have discussed the diagnosis with the patient and the intended plan as seen in the above orders. The patient has received an after-visit summary and questions were answered concerning future plans. I have discussed medication side effects and warnings with the patient as well. I have reviewed the plan of care with the patient, accepted their input and they are in agreement with the treatment goals. All questions were answered. The patient understands the plan of care. Handouts provided today with above information. Pt instructed if symptoms worsen to call the office or report to the ED for continued care. Greater than 50% of the visit time was spent in counseling and/or coordination of care. Voice recognition was used to generate this report, which may have resulted in some phonetic based errors in grammar and contents. Even though attempts were made to correct all the mistakes, some may have been missed, and remained in the body of the document. Follow-up and Dispositions    Return in about 6 months (around 3/30/2023).          Cherie Tubbs MD

## 2022-09-14 NOTE — PATIENT INSTRUCTIONS
Latest Reference Range & Units 5/5/22 08:46 8/31/22 11:12   WBC 3.8 - 10.8 Thousand/uL 6.4    RBC 3.80 - 5.10 Million/uL 4.99    HGB 11.7 - 15.5 g/dL 14.8    HCT 35.0 - 45.0 % 45.8 (H)    MCV 80.0 - 100.0 fL 91.8    MCH 27.0 - 33.0 pg 29.7    MCHC 32.0 - 36.0 g/dL 32.3    RDW 11.0 - 15.0 % 12.9    PLATELET 911 - 785 Thousand/uL 283    MEAN PLATELET VOLUME 7.5 - 12.5 fL 10.7    LYMPHOCYTES % 45.3    MONOCYTES % 9.2    EOSINOPHILS % 3.1    BASOPHILS % 0.9    ABS. NEUTROPHILS 1,500 - 7,800 cells/uL 2,656    ABS. LYMPHOCYTES 850 - 3,900 cells/uL 2,899    ABS. MONOCYTES 200 - 950 cells/uL 589    ABS. EOSINOPHILS 15 - 500 cells/uL 198    ABS.  BASOPHILS 0 - 200 cells/uL 58    Sodium 135 - 146 mmol/L 140 139   Potassium 3.5 - 5.3 mmol/L 4.9 4.9   Chloride 98 - 110 mmol/L 104 103   CO2 20 - 32 mmol/L 31 29   Glucose 65 - 99 mg/dL 89 84   BUN 7 - 25 mg/dL 24 24   Creatinine 0.60 - 0.95 mg/dL 1.16 (H) 1.23 (H)   BUN/Creatinine ratio 6 - 22 (calc) 21 20   Calcium 8.6 - 10.4 mg/dL 9.7 9.9   GFR est non-AA > OR = 60 mL/min/1.73m2 43 (L)    GFR est AA > OR = 60 mL/min/1.73m2 49 (L)    eGFR > OR = 60 mL/min/1.73m2  43 (L)   Bilirubin, total 0.2 - 1.2 mg/dL 0.6 0.8   Protein, total 6.1 - 8.1 g/dL 6.8 6.7   Albumin 3.6 - 5.1 g/dL 4.3 4.3   Globulin 1.9 - 3.7 g/dL (calc) 2.5 2.4   ALT 6 - 29 U/L 10 12   AST 10 - 35 U/L 17 19   Triglyceride <150 mg/dL 88 77   Cholesterol, total <200 mg/dL 210 (H) 202 (H)   HDL Cholesterol > OR = 50 mg/dL 62 58   Non-HDL Cholesterol <130 mg/dL (calc) 148 (H) 144 (H)   LDL-CHOLESTEROL mg/dL (calc) 129 (H) 127 (H)   Cholesterol/HDL ratio <5.0 (calc) 3.4 3.5   Alkaline Phosphatase, total 37 - 153 U/L 68 62   (H): Data is abnormally high  (L): Data is abnormally low

## 2022-09-14 NOTE — TELEPHONE ENCOUNTER
Please advise when future labs are put in [facility] and I will mail them to her.     Pt goes offsite for lab collection ([facility]) I    She made 6 month follow up appt for 03/29/2023    Thank you

## 2022-11-21 ENCOUNTER — TELEPHONE (OUTPATIENT)
Dept: INTERNAL MEDICINE CLINIC | Age: 87
End: 2022-11-21

## 2022-11-21 NOTE — TELEPHONE ENCOUNTER
Pt is requesting medication be sent in to [facility] [address] for runny nose, dry cough and dry heaves when she tries to eat. Symptoms started yesterday. All she has taken has been 2 allergy pills this morning.       Please advise her at [phone]

## 2022-11-23 ENCOUNTER — VIRTUAL VISIT (OUTPATIENT)
Dept: INTERNAL MEDICINE CLINIC | Age: 87
End: 2022-11-23

## 2022-11-23 DIAGNOSIS — J30.9 ALLERGIC RHINITIS, UNSPECIFIED SEASONALITY, UNSPECIFIED TRIGGER: Primary | ICD-10-CM

## 2022-11-23 RX ORDER — METHYLPREDNISOLONE 4 MG/1
TABLET ORAL
Qty: 1 DOSE PACK | Refills: 0 | Status: SHIPPED | OUTPATIENT
Start: 2022-11-23

## 2022-11-23 NOTE — PROGRESS NOTES
Glenis Reddy is a 80 y.o. female who was seen by synchronous (real-time) audio-video*** technology on 11/23/2022. Assessment & Plan:   There are no diagnoses linked to this encounter. Lab review: labs are reviewed in the EHR***     I have discussed the diagnosis with the patient and the intended plan as seen in the above orders. I have discussed medication side effects and warnings with the patient as well. I have reviewed the plan of care with the patient, accepted their input and they are in agreement with the treatment goals. All questions were answered. The patient understands the plan of care. Pt instructed if symptoms worsen to call the office or report to the ED for continued care. Greater than 50% of the visit time was spent in counseling and/or coordination of care. I spent 4 min with the pt for this phone only encounter. Voice recognition was used to generate this report, which may have resulted in some phonetic based errors in grammar and contents. Even though attempts were made to correct all the mistakes, some may have been missed, and remained in the body of the document. Subjective:   Glenis Reddy was seen for   Chief Complaint   Patient presents with    Cough     The patient is an 59-year-old female with history of cerebral atrophy per head CT findings, white coat hypertension, urethral polyp seen on cystoscopy, microscopic hematuria, h/o breast cancer (declining further mammograms) s/p b/l mastectomy, glaucoma, chronic kidney disease stage III, history of TIA. URI: She reports: her sx are similar to what she had last yr. She responded well to the rx (medrol dose pack) I ordered a year ago. She is asking for this same rx to be sent to her pharmacy. She believes that allergies played a role in her sx. +Dry cough, clear mucous, no congestion or fever. +Sneezing. +Rhinorrhea. No sick contacts. Sx began on Sunday. No SOB.        Prior to Admission medications    Medication Sig Start Date End Date Taking? Authorizing Provider   LUMIGAN 0.01 % ophthalmic drops Administer 1 Drop to right eye nightly. 8/21/19   Provider, Historical   RHOPRESSA 0.02 % drop INSTILL 1 DROP IN RIGHT EYE DAILY DINNER 7/20/18   Provider, Historical   propylene glycol/peg 400 (SYSTANE LIQUID GEL OP) Apply  to eye daily as needed. Provider, Historical   Calcium Carbonate-Vit D3-Min 600 mg calcium- 400 unit tab Take  by mouth every other day. Patient not taking: No sig reported    Provider, Historical   aspirin 81 mg CpDR Take 81 mg by mouth daily. Patient taking 2 81 mg tablets a day    Provider, Historical   multivitamin (ONE A DAY) tablet Take 1 Tab by mouth every other day.   Patient not taking: No sig reported    Provider, Historical     Allergies   Allergen Reactions    Alphagan P [Brimonidine] Itching    Pravastatin Swelling and Myalgia     Swelling in the throat, tongue    Codeine Nausea and Vomiting    Darvocet A500 [Propoxyphene N-Acetaminophen] Nausea and Vomiting    Darvon [Propoxyphene] Nausea and Vomiting    Demerol [Meperidine] Nausea and Vomiting    Entex [Phenylephrine-Guaifenesin] Nausea and Vomiting    Erythromycin Nausea and Vomiting    Other Medication Shortness of Breath     No beta blockers    Sulfa (Sulfonamide Antibiotics) Nausea and Vomiting    Timolol Nausea and Vomiting    Vicodin [Hydrocodone-Acetaminophen] Other (comments)     confusion     Past Medical History:   Diagnosis Date    Breast CA [facility]) 0690/2300 1984 left, 1986 right    CKD (chronic kidney disease) stage 3, GFR 30-59 ml/min ([facility])     Glaucoma     TIA (transient ischemic attack) 2009     Past Surgical History:   Procedure Laterality Date    HX CATARACT REMOVAL Left 2006    Chrissie Bingham 10    HX MASTECTOMY  0712,9769     Family History   Problem Relation Age of Onset    Cancer Mother         breast    Hypertension Mother     OSTEOARTHRITIS Mother     Dementia Mother     Hypertension Father     Heart Disease Father     OSTEOARTHRITIS Father     Hypertension Sister     Diabetes Sister     Cancer Brother         lung    Hypertension Brother     Heart Disease Brother      Social History     Socioeconomic History    Marital status:    Tobacco Use    Smoking status: Never    Smokeless tobacco: Never   Substance and Sexual Activity    Alcohol use: No     Alcohol/week: 0.0 standard drinks    Drug use: Yes     Types: Prescription, OTC    Sexual activity: Not Currently     Partners: Male   Social History Narrative    Activity level-walks 2 miles 1x week on Sunday    Engages in gardening, routine house chores without difficulty               ROS:  Gen: No fever/chills  HEENT: No sore throat, eye pain, ear pain, or congestion.  No HA  CV: No CP  Resp: No cough/SOB  GI: No abdominal pain  : No hematuria/dysuria  Derm: No rash  Neuro: No new paresthesias/weakness  Musc: No new myalgias/jt pain  Psych: No depression sx      Objective:     General: {gen appear:99925::\"alert\",\"cooperative\",\"no distress\"}   Mental  status: {mental status:805650::\"alert, oriented to person, place, and time\",\"normal mood, behavior, speech, dress, motor activity, and thought processes\":1}   Resp: {resp:78927::\"normal effort\",\"no respiratory distress\":1}   Neuro: {neuro:19964::\"no gross deficits\":1}   Skin: {skin:838468::\"no discoloration or lesions of concern on visible areas\":1}     LABS:  Lab Results   Component Value Date/Time    Sodium 139 08/31/2022 11:12 AM    Potassium 4.9 08/31/2022 11:12 AM    Chloride 103 08/31/2022 11:12 AM    CO2 29 08/31/2022 11:12 AM    Glucose 84 08/31/2022 11:12 AM    BUN 24 08/31/2022 11:12 AM    Creatinine 1.23 (H) 08/31/2022 11:12 AM    BUN/Creatinine ratio 20 08/31/2022 11:12 AM    GFR est AA 49 (L) 05/05/2022 08:46 AM    GFR est non-AA 43 (L) 05/05/2022 08:46 AM    Calcium 9.9 08/31/2022 11:12 AM       Lab Results   Component Value Date/Time    Cholesterol, total 202 (H) 08/31/2022 11:12 AM    HDL Cholesterol 58 08/31/2022 11:12 AM    LDL-CHOLESTEROL 127 (H) 08/31/2022 11:12 AM    LDL, calculated 140 (H) 04/03/2014 12:00 AM    VLDL, calculated 27 04/03/2014 12:00 AM    Triglyceride 77 08/31/2022 11:12 AM    Cholesterol/HDL ratio 3.5 08/31/2022 11:12 AM       Lab Results   Component Value Date/Time    WBC 6.4 05/05/2022 08:46 AM    HGB 14.8 05/05/2022 08:46 AM    HCT 45.8 (H) 05/05/2022 08:46 AM    PLATELET 725 54/48/0025 08:46 AM    MCV 91.8 05/05/2022 08:46 AM       No results found for: HBA1C, PDW4VOLM, PDT4BXJN, FYE6XCPM    Lab Results   Component Value Date/Time    TSH 3.490 04/03/2014 12:00 AM           Due to this being a TeleHealth *** evaluation, many elements of the physical examination are unable to be assessed. The pt was seen by synchronous (real-time) audio-video*** technology, and/or her healthcare decision maker, is aware that this patient-initiated, Telehealth encounter is a billable service, with coverage as determined by her insurance carrier. She is aware that she may receive a bill and has provided verbal consent to proceed: Yes. The patient (or guardian if applicable) is aware that this is a billable service, which includes applicable copays. This virtual visit was conducted with the patient's (and/or legal guardian's) consent. The pt is located in a state where the provider was licensed to provide care. The pt is being evaluated by a video*** visit encounter for concerns as above. A caregiver was present when appropriate. Due to this being a TeleHealth encounter (During [facility]-90 public health emergency), evaluation of the following organ systems was limited: Vitals/Constitutional/EENT/Resp/CV/GI//MS/Neuro/Skin/Heme-Lymph-Imm.    Pursuant to the emergency declaration under the [facility], 1135 waiver authority and the "[employer] and [employer] Act, this Virtual *** Visit was conducted, with patient's (and/or legal guardian's) consent, to reduce the patient's risk of exposure to COVID-19 and provide necessary medical care. Services were provided through a video*** synchronous discussion virtually to substitute for in-person clinic visit. Patient and provider were located at their individual home/office respectively. We discussed the expected course, resolution and complications of the diagnosis(es) in detail. Medication risks, benefits, costs, interactions, and alternatives were discussed as indicated. I advised her to contact the office if her condition worsens, changes or fails to improve as anticipated. She expressed understanding with the diagnosis(es) and plan. [facility], who was evaluated through a synchronous (real-time) {virtual platform audio-video vs audio only:11460::\"audio-video\"} encounter, and/or her healthcare decision maker, is aware that it is a billable service, which includes applicable co-pays, with coverage as determined by her insurance carrier. She provided verbal consent to proceed and patient identification was verified. This visit was conducted pursuant to the emergency declaration under the [address] [place] [address] authority and the [employer] and [employer] General Act. A caregiver was present when appropriate. Ability to conduct physical exam was limited. The patient was located at: {[employer] [facility] - Patient:[id number]}  The provider was located at: {[employer] [facility] - Provider:[id number]}    --Owen Priest MD on 11/23/2022 at 12:43 PM        Naziarichie Pedroza, was evaluated through a synchronous (real-time) audio-video encounter. The patient (or guardian if applicable) is aware that this is a billable service, which includes applicable co-pays. This Virtual Visit was conducted with patient's (and/or legal guardian's) consent.  The visit was conducted pursuant to the emergency declaration under the [address] [facility], [address] [place] [address] waiver authority and the [employer] General Act. Patient identification was verified, and a caregiver was present when appropriate.   The patient was located at: {[facility] - Patient:[id number]}  The provider was located at: {[facility] POS - Provider:[id number]}

## 2022-11-23 NOTE — TELEPHONE ENCOUNTER
Pt calling for status of RX request.     She has not tested for covid.  She is aware the office will call her back re: a virtual appt

## 2022-11-28 ENCOUNTER — TELEPHONE (OUTPATIENT)
Dept: INTERNAL MEDICINE CLINIC | Age: 87
End: 2022-11-28

## 2022-11-28 DIAGNOSIS — J32.9 SINUSITIS, UNSPECIFIED CHRONICITY, UNSPECIFIED LOCATION: Primary | ICD-10-CM

## 2022-11-28 RX ORDER — DOXYCYCLINE 100 MG/1
100 TABLET ORAL 2 TIMES DAILY
Qty: 14 TABLET | Refills: 0 | Status: SHIPPED | OUTPATIENT
Start: 2022-11-28 | End: 2022-12-05

## 2022-11-28 NOTE — TELEPHONE ENCOUNTER
Patient had an virtual visit last week and was prescribed methylPREDNISolone (MEDROL DOSEPACK) 4 mg tablet. She was instructed to call back today if she was not feeling better. She states she finished the medication, but she is not better, is still coughing and blowing out yellow mucus. Pharmacy: [facility] [address] [facility]. Patient can be reached at [phone].   Please advise, thank you

## 2022-11-28 NOTE — TELEPHONE ENCOUNTER
Please let her know that I ordered doxycycline for presumed bacterial sinusitis.     Dr. Fermin Bartholomew  [facility], [address] [facility], [address]  Phone: [phone]  Fax: [phone]

## 2022-11-29 NOTE — PROGRESS NOTES
The patient is an 20-year-old female with history of cerebral atrophy per head CT findings, white coat hypertension, urethral polyp seen on cystoscopy, microscopic hematuria, h/o breast cancer (declining further mammograms) s/p b/l mastectomy, glaucoma, chronic kidney disease stage III, history of TIA. URI: She reports: her sx are similar to what she had last yr. She responded well to the rx (medrol dose pack) I ordered a year ago. She is asking for this same rx to be sent to her pharmacy. She believes that allergies played a role in her sx. +Dry cough, clear mucous, no congestion or fever. +Sneezing. +Rhinorrhea. No sick contacts. Sx began on Sunday. No SOB. ICD-10-CM ICD-9-CM    1. Allergic rhinitis, unspecified seasonality, unspecified trigger  J30.9 477.9 methylPREDNISolone (MEDROL DOSEPACK) 4 mg tablet        Will rx for presumed allergies. She will notify me if her sx worsen. Pt encouraged to do a Covid test (rapid OTC). I spent 4 min with the pt for this phone only encounter.      Dr. Didier Herron  [facility], [address] [facility] [facility], [address] [place] [address] Str.  Phone: [phone]  Fax: [phone]

## 2022-12-05 NOTE — TELEPHONE ENCOUNTER
Please let her know that I am ordering benzonatate for her to take as needed for her cough symptoms.     Dr. Kelly Trejo  [facility], [address] [facility], [address] [place] [address] Str.  Phone: [phone]  Fax: [phone]

## 2022-12-05 NOTE — TELEPHONE ENCOUNTER
She said still has a dry strong cough. No other symptoms.   Said she had been on antibiotic and that had cleared up the infection    Asking for RX for cough     Pharmacy is [address] [place] [address]

## 2023-02-04 DIAGNOSIS — Z85.3 HISTORY OF BREAST CANCER: Primary | ICD-10-CM

## 2023-02-05 DIAGNOSIS — E78.5 HYPERLIPIDEMIA, UNSPECIFIED HYPERLIPIDEMIA TYPE: Primary | ICD-10-CM

## 2023-02-06 DIAGNOSIS — E78.5 HYPERLIPIDEMIA, UNSPECIFIED HYPERLIPIDEMIA TYPE: Primary | ICD-10-CM

## 2023-03-09 LAB
ALB/GLOBRATIO, 58C: 1.8 (CALC) (ref 1–2.5)
ALBUMIN SERPL-MCNC: 4.2 G/DL (ref 3.6–5.1)
ALKALINE PHOSPHATASE, TOTAL, 25002000: 65 U/L (ref 37–153)
ALT SERPL-CCNC: 12 U/L (ref 6–29)
AST SERPL W P-5'-P-CCNC: 20 U/L (ref 10–35)
BASOPHILS # BLD: 69 CELLS/UL (ref 0–200)
BASOPHILS NFR BLD: 1.1 %
BILIRUB SERPL-MCNC: 0.6 MG/DL (ref 0.2–1.2)
BUN SERPL-MCNC: 22 MG/DL (ref 7–25)
BUN/CREATININE RATIO,BUCR: 20 (CALC) (ref 6–22)
CALCIUM SERPL-MCNC: 9.7 MG/DL (ref 8.6–10.4)
CHLORIDE SERPL-SCNC: 105 MMOL/L (ref 98–110)
CHOL/HDL RATIO,CHHDX: 3.1 (CALC)
CHOLEST SERPL-MCNC: 200 MG/DL
CO2 SERPL-SCNC: 32 MMOL/L (ref 20–32)
CREAT SERPL-MCNC: 1.11 MG/DL (ref 0.6–0.95)
EGFR: 48 ML/MIN/1.73M2
EOSINOPHIL # BLD: 139 CELLS/UL (ref 15–500)
EOSINOPHIL NFR BLD: 2.2 %
ERYTHROCYTE [DISTWIDTH] IN BLOOD BY AUTOMATED COUNT: 12.5 % (ref 11–15)
GLOBULIN,GLOB: 2.4 G/DL (CALC) (ref 1.9–3.7)
GLUCOSE SERPL-MCNC: 92 MG/DL (ref 65–99)
HCT VFR BLD AUTO: 45.7 % (ref 35–45)
HDLC SERPL-MCNC: 65 MG/DL
HGB BLD-MCNC: 14.9 G/DL (ref 11.7–15.5)
LDL-CHOLESTEROL: 114 MG/DL (CALC)
LYMPHOCYTES # BLD: 2463 CELLS/UL (ref 850–3900)
LYMPHOCYTES NFR BLD: 39.1 %
MCH RBC QN AUTO: 30.7 PG (ref 27–33)
MCHC RBC AUTO-ENTMCNC: 32.6 G/DL (ref 32–36)
MCV RBC AUTO: 94 FL (ref 80–100)
MONOCYTES # BLD: 586 CELLS/UL (ref 200–950)
MONOCYTES NFR BLD: 9.3 %
NEUTROPHILS # BLD AUTO: 3043 CELLS/UL (ref 1500–7800)
NEUTROPHILS # BLD: 48.3 %
NON-HDL CHOLESTEROL, 011976: 135 MG/DL (CALC)
PLATELET # BLD AUTO: 264 THOUSAND/UL (ref 140–400)
PMV BLD AUTO: 10.5 FL (ref 7.5–12.5)
POTASSIUM SERPL-SCNC: 5.1 MMOL/L (ref 3.5–5.3)
PROT SERPL-MCNC: 6.6 G/DL (ref 6.1–8.1)
RBC # BLD AUTO: 4.86 MILLION/UL (ref 3.8–5.1)
SODIUM SERPL-SCNC: 142 MMOL/L (ref 135–146)
TRIGL SERPL-MCNC: 100 MG/DL (ref ?–150)
WBC # BLD AUTO: 6.3 THOUSAND/UL (ref 3.8–10.8)

## 2023-03-29 ENCOUNTER — OFFICE VISIT (OUTPATIENT)
Age: 88
End: 2023-03-29
Payer: OTHER GOVERNMENT

## 2023-03-29 ENCOUNTER — TELEPHONE (OUTPATIENT)
Age: 88
End: 2023-03-29

## 2023-03-29 VITALS
TEMPERATURE: 98.2 F | SYSTOLIC BLOOD PRESSURE: 139 MMHG | HEIGHT: 63 IN | WEIGHT: 116 LBS | DIASTOLIC BLOOD PRESSURE: 62 MMHG | HEART RATE: 79 BPM | BODY MASS INDEX: 20.55 KG/M2 | OXYGEN SATURATION: 99 % | RESPIRATION RATE: 12 BRPM

## 2023-03-29 DIAGNOSIS — E78.5 HYPERLIPIDEMIA, UNSPECIFIED HYPERLIPIDEMIA TYPE: Primary | ICD-10-CM

## 2023-03-29 DIAGNOSIS — Z85.3 PERSONAL HISTORY OF MALIGNANT NEOPLASM OF BREAST: ICD-10-CM

## 2023-03-29 DIAGNOSIS — N18.31 STAGE 3A CHRONIC KIDNEY DISEASE (HCC): ICD-10-CM

## 2023-03-29 PROCEDURE — G8428 CUR MEDS NOT DOCUMENT: HCPCS | Performed by: INTERNAL MEDICINE

## 2023-03-29 PROCEDURE — G8484 FLU IMMUNIZE NO ADMIN: HCPCS | Performed by: INTERNAL MEDICINE

## 2023-03-29 PROCEDURE — 1090F PRES/ABSN URINE INCON ASSESS: CPT | Performed by: INTERNAL MEDICINE

## 2023-03-29 PROCEDURE — 1036F TOBACCO NON-USER: CPT | Performed by: INTERNAL MEDICINE

## 2023-03-29 PROCEDURE — 99214 OFFICE O/P EST MOD 30 MIN: CPT | Performed by: INTERNAL MEDICINE

## 2023-03-29 PROCEDURE — 1123F ACP DISCUSS/DSCN MKR DOCD: CPT | Performed by: INTERNAL MEDICINE

## 2023-03-29 PROCEDURE — G8420 CALC BMI NORM PARAMETERS: HCPCS | Performed by: INTERNAL MEDICINE

## 2023-03-29 RX ORDER — M-VIT,TX,IRON,MINS/CALC/FOLIC 27MG-0.4MG
1 TABLET ORAL DAILY
COMMUNITY

## 2023-03-29 RX ORDER — ASPIRIN 81 MG/1
81 TABLET ORAL DAILY
COMMUNITY

## 2023-03-29 SDOH — ECONOMIC STABILITY: FOOD INSECURITY: WITHIN THE PAST 12 MONTHS, YOU WORRIED THAT YOUR FOOD WOULD RUN OUT BEFORE YOU GOT MONEY TO BUY MORE.: NEVER TRUE

## 2023-03-29 SDOH — ECONOMIC STABILITY: FOOD INSECURITY: WITHIN THE PAST 12 MONTHS, THE FOOD YOU BOUGHT JUST DIDN'T LAST AND YOU DIDN'T HAVE MONEY TO GET MORE.: NEVER TRUE

## 2023-03-29 SDOH — ECONOMIC STABILITY: INCOME INSECURITY: HOW HARD IS IT FOR YOU TO PAY FOR THE VERY BASICS LIKE FOOD, HOUSING, MEDICAL CARE, AND HEATING?: NOT VERY HARD

## 2023-03-29 SDOH — ECONOMIC STABILITY: HOUSING INSECURITY
IN THE LAST 12 MONTHS, WAS THERE A TIME WHEN YOU DID NOT HAVE A STEADY PLACE TO SLEEP OR SLEPT IN A SHELTER (INCLUDING NOW)?: NO

## 2023-03-29 ASSESSMENT — PATIENT HEALTH QUESTIONNAIRE - PHQ9
2. FEELING DOWN, DEPRESSED OR HOPELESS: 0
SUM OF ALL RESPONSES TO PHQ9 QUESTIONS 1 & 2: 0
SUM OF ALL RESPONSES TO PHQ QUESTIONS 1-9: 0
SUM OF ALL RESPONSES TO PHQ QUESTIONS 1-9: 0
1. LITTLE INTEREST OR PLEASURE IN DOING THINGS: 0
SUM OF ALL RESPONSES TO PHQ QUESTIONS 1-9: 0
SUM OF ALL RESPONSES TO PHQ QUESTIONS 1-9: 0

## 2023-03-29 ASSESSMENT — ENCOUNTER SYMPTOMS
BLOOD IN STOOL: 0
ANAL BLEEDING: 0
COUGH: 0
SHORTNESS OF BREATH: 0
EYE PAIN: 0
SORE THROAT: 0
ABDOMINAL PAIN: 0

## 2023-03-29 NOTE — TELEPHONE ENCOUNTER
Patient called and states she was seen this morning and was given a lab order for her to get labs done offsite in a year for her next annual, she states the expected date says 3/29/23 and it expires 3/29/24, but the expected date should say 2024. She is requesting if that can be adjusted and mailed to her?   Please advise, thank you

## 2023-03-29 NOTE — PROGRESS NOTES
Batsheva Booth presents today for   Chief Complaint   Patient presents with    Follow-up    Discuss Labs     3-8-23             1. \"Have you been to the ER, urgent care clinic since your last visit? Hospitalized since your last visit? \" NO    2. \"Have you seen or consulted any other health care providers outside of the [address] [place] [address] since your last visit? \" YES     3. For patients aged 39-70: Has the patient had a colonoscopy / FIT/ Cologuard? NA - based on age      If the patient is female:    4. For patients aged 41-77: Has the patient had a mammogram within the past 2 years? NA - based on age or sex      11. For patients aged 21-65: Has the patient had a pap smear?  NA - based on age or sex
questions were answered. The patient understands the plan of care. Handouts provided today with above information. Pt instructed if symptoms worsen to call the office or report to the ED for continued care. Greater than 50% of the visit time was spent in counseling and/or coordination of care. Voice recognition was used to generate this report, which may have resulted in some phonetic based errors in grammar and contents. Even though attempts were made to correct all the mistakes, some may have been missed, and remained in the body of the document. No follow-up provider specified.     Luz Maria Segura MD

## 2024-03-28 ENCOUNTER — TELEPHONE (OUTPATIENT)
Age: 89
End: 2024-03-28

## 2024-03-28 NOTE — TELEPHONE ENCOUNTER
----- Message from Elisa Tabby sent at 3/28/2024  9:49 AM EDT -----  Subject: Message to Provider    QUESTIONS  Information for Provider? patient returning call from Bernice - please   reach out to patient  ---------------------------------------------------------------------------  --------------  CALL BACK INFO  [phone]; OK to leave message on voicemail  ---------------------------------------------------------------------------  --------------  SCRIPT ANSWERS  undefined

## 2024-07-10 ENCOUNTER — OFFICE VISIT (OUTPATIENT)
Facility: CLINIC | Age: 89
End: 2024-07-10
Payer: OTHER GOVERNMENT

## 2024-07-10 VITALS
HEART RATE: 90 BPM | TEMPERATURE: 98.8 F | WEIGHT: 108 LBS | SYSTOLIC BLOOD PRESSURE: 148 MMHG | RESPIRATION RATE: 17 BRPM | OXYGEN SATURATION: 97 % | HEIGHT: 61 IN | BODY MASS INDEX: 20.39 KG/M2 | DIASTOLIC BLOOD PRESSURE: 65 MMHG

## 2024-07-10 DIAGNOSIS — Z01.30 BLOOD PRESSURE CHECK: ICD-10-CM

## 2024-07-10 DIAGNOSIS — E78.5 HYPERLIPIDEMIA, UNSPECIFIED HYPERLIPIDEMIA TYPE: Primary | ICD-10-CM

## 2024-07-10 DIAGNOSIS — N18.31 STAGE 3A CHRONIC KIDNEY DISEASE (HCC): ICD-10-CM

## 2024-07-10 PROCEDURE — 99214 OFFICE O/P EST MOD 30 MIN: CPT | Performed by: INTERNAL MEDICINE

## 2024-07-10 PROCEDURE — 1123F ACP DISCUSS/DSCN MKR DOCD: CPT | Performed by: INTERNAL MEDICINE

## 2024-07-10 NOTE — PROGRESS NOTES
Ariane Armenta presents today for   Chief Complaint   Patient presents with    Follow-up     BP retake: 148/65       \"Have you been to the ER, urgent care clinic since your last visit?  Hospitalized since your last visit?\"    NO    “Have you seen or consulted any other health care providers outside of [facility] since your last visit?”    NO

## 2024-07-10 NOTE — PATIENT INSTRUCTIONS
Latest Reference Range & Units 05/05/22 08:46 08/31/22 11:12 03/08/23 08:19   Sodium 135 - 146 mmol/L 140 139 142   Potassium 3.5 - 5.3 mmol/L 4.9 4.9 5.1   Chloride 98 - 110 mmol/L 104 103 105   CARBON DIOXIDE 20 - 32 mmol/L 31 29 32   BUN,BUNPL 7 - 25 mg/dL 24 24 22   Creatinine 0.60 - 0.95 mg/dL 1.16 (H) 1.23 (H) 1.11 (H)   Bun/Cre 6 - 22 (calc) 21 20 20   Est, Glom Filt Rate > OR = 60 mL/min/1.73m2  43 (L) 48 (L)   GFR  > OR = 60 mL/min/1.73m2 49 (L)     Glucose 65 - 99 mg/dL 89 84 92   Calcium 8.6 - 10.4 mg/dL 9.7 9.9 9.7   Albumin/Globulin Ratio 1.0 - 2.5 (calc) 1.7 1.8 1.8   Total Protein 6.1 - 8.1 g/dL 6.8 6.7 6.6   Chol/HDL Ratio <5.0 (calc) 3.4 3.5 3.1   Cholesterol, Total <200 mg/dL 210 (H) 202 (H) 200 (H)   HDL Cholesterol > OR = 50 mg/dL 62 58 65   LDL Cholesterol mg/dL (calc) 129 (H) 127 (H) 114 (H)   Triglycerides <150 mg/dL 88 77 100   Non-HDL Cholesterol <130 mg/dL (calc) 148 (H) 144 (H) 135 (H)   Albumin 3.6 - 5.1 g/dL 4.3 4.3 4.2   Globulin 1.9 - 3.7 g/dL (calc) 2.5 2.4 2.4   Alkaline Phosphatase 37 - 153 U/L 68 62 65   ALT 6 - 29 U/L 10 12 12   AST 10 - 35 U/L 17 19 20   Total Bilirubin 0.2 - 1.2 mg/dL 0.6 0.8 0.6   WBC 3.8 - 10.8 Thousand/uL 6.4  6.3   RBC 3.80 - 5.10 Million/uL 4.99  4.86   Hemoglobin Quant 11.7 - 15.5 g/dL 14.8  14.9   Hematocrit 35.0 - 45.0 % 45.8 (H)  45.7 (H)   MCV 80.0 - 100.0 fL 91.8  94.0   MCH 27.0 - 33.0 pg 29.7  30.7   MCHC 32.0 - 36.0 g/dL 32.3  32.6   MPV 7.5 - 12.5 fL 10.7  10.5   RDW 11.0 - 15.0 % 12.9  12.5   Platelet Count 140 - 400 Thousand/uL 283  264   Neutrophils % % 41.5  48.3   Lymphocyte % % 45.3  39.1   Monocytes % % 9.2  9.3   Eosinophils % % 3.1  2.2   Basophils % % 0.9  1.1   Neutrophils Absolute 1,500 - 7,800 cells/uL 2,656  3,043   Lymphocytes Absolute 850 - 3,900 cells/uL 2,899  2,463   Monocytes Absolute 200 - 950 cells/uL 589  586   Eosinophils Absolute 15 - 500 cells/uL 198  139   Basophils Absolute 0 - 200 cells/uL 58  69

## 2024-07-10 NOTE — PROGRESS NOTES
[facility]:  7/16/2024, MRN: [id number]      Ariane Armenta is a 89 y.o. female and presents to clinic for Follow-up      Subjective:   The patient is an 89-year-old female with history of cerebral atrophy per head CT findings, white coat hypertension, urethral polyp seen on cystoscopy, microscopic hematuria, h/o breast cancer (declining further mammograms) s/p b/l mastectomy, glaucoma, chronic kidney disease stage III, history of TIA.     1.  CKD 3 and Hypertension: Blood pressure today is 148/65 but her home readings are under 140/90.  Asymptomatic.  She brought in her blood pressure machine today it is accurate.  Meanwhile, her estimated GFR for her March labs is in the low 40s range.  This is her baseline.  No urinary symptoms.  Her creatinine is mildly elevated but at the range it has been for the last 2 years.  Her range is typically 1.1-1.2.  Her BUN per her March labs is normal.    2.  Hyperlipidemia: Patient has a history of cholesterol below 200.  Her LDL was normal per recent labs in March.  She declines medication to lower her CVD risk.       Patient Active Problem List    Diagnosis Date Noted    History of breast cancer 03/12/2018    White coat syndrome without hypertension 09/11/2017    Urethral polyp 12/27/2016    Microscopic hematuria 12/27/2016    Glaucoma 07/16/2013    H/O TIA (transient ischemic attack) and stroke 06/05/2013       Current Outpatient Medications   Medication Sig Dispense Refill    aspirin 81 MG EC tablet Take 2 tablets by mouth nightly      polyethyl glycol-propyl glycol 0.4-0.3 % (SYSTANE) 0.4-0.3 % ophthalmic solution 1 drop as needed for Dry Eyes      bimatoprost (LUMIGAN) 0.01 % SOLN ophthalmic drops Apply 1 drop to eye      Multiple Vitamins-Minerals (THERAPEUTIC MULTIVITAMIN-MINERALS) tablet Take 1 tablet by mouth daily (Patient not taking: Reported on 6/5/2024)       No current facility-administered medications for this visit.       Allergies   Allergen

## 2024-07-16 ASSESSMENT — ENCOUNTER SYMPTOMS
SORE THROAT: 0
COUGH: 0
SHORTNESS OF BREATH: 0
BLOOD IN STOOL: 0
EYE PAIN: 0
ABDOMINAL PAIN: 0
ANAL BLEEDING: 0

## 2024-12-31 ENCOUNTER — TELEPHONE (OUTPATIENT)
Facility: CLINIC | Age: 88
End: 2024-12-31

## 2024-12-31 NOTE — TELEPHONE ENCOUNTER
Patient contacted the office she has a cough and runny nose that started yesterday, she is requesting Dr Somers send in an rx to [facility].

## 2024-12-31 NOTE — TELEPHONE ENCOUNTER
Advised per provider of the following msg:       Please have her go to an urgent care facility. I am not in the office today and cannot assess her via a VV.     Pt refuse due to she states she will  more diseases in the urgent care sitting in the environment. Pt states she will get cough drops to assist.    KULDIP Elaine LPN

## 2025-07-16 ENCOUNTER — OFFICE VISIT (OUTPATIENT)
Facility: CLINIC | Age: 89
End: 2025-07-16
Payer: OTHER GOVERNMENT

## 2025-07-16 VITALS
DIASTOLIC BLOOD PRESSURE: 49 MMHG | TEMPERATURE: 97.3 F | BODY MASS INDEX: 20.2 KG/M2 | HEIGHT: 61 IN | OXYGEN SATURATION: 98 % | SYSTOLIC BLOOD PRESSURE: 145 MMHG | WEIGHT: 107 LBS | RESPIRATION RATE: 16 BRPM | HEART RATE: 87 BPM

## 2025-07-16 DIAGNOSIS — H40.9 GLAUCOMA, UNSPECIFIED GLAUCOMA TYPE, UNSPECIFIED LATERALITY: ICD-10-CM

## 2025-07-16 DIAGNOSIS — Z13.220 SCREENING FOR HYPERLIPIDEMIA: ICD-10-CM

## 2025-07-16 DIAGNOSIS — I95.9 HYPOTENSION, UNSPECIFIED HYPOTENSION TYPE: ICD-10-CM

## 2025-07-16 DIAGNOSIS — N18.31 STAGE 3A CHRONIC KIDNEY DISEASE (HCC): Primary | ICD-10-CM

## 2025-07-16 DIAGNOSIS — Z85.3 HISTORY OF BREAST CANCER: ICD-10-CM

## 2025-07-16 PROCEDURE — 1123F ACP DISCUSS/DSCN MKR DOCD: CPT | Performed by: INTERNAL MEDICINE

## 2025-07-16 PROCEDURE — 99214 OFFICE O/P EST MOD 30 MIN: CPT | Performed by: INTERNAL MEDICINE

## 2025-07-16 RX ORDER — LORATADINE 10 MG/1
10 TABLET ORAL DAILY PRN
Qty: 30 TABLET | Refills: 11 | Status: SHIPPED | OUTPATIENT
Start: 2025-07-16

## 2025-07-16 SDOH — ECONOMIC STABILITY: FOOD INSECURITY: WITHIN THE PAST 12 MONTHS, YOU WORRIED THAT YOUR FOOD WOULD RUN OUT BEFORE YOU GOT MONEY TO BUY MORE.: NEVER TRUE

## 2025-07-16 SDOH — ECONOMIC STABILITY: FOOD INSECURITY: WITHIN THE PAST 12 MONTHS, THE FOOD YOU BOUGHT JUST DIDN'T LAST AND YOU DIDN'T HAVE MONEY TO GET MORE.: NEVER TRUE

## 2025-07-16 ASSESSMENT — PATIENT HEALTH QUESTIONNAIRE - PHQ9
1. LITTLE INTEREST OR PLEASURE IN DOING THINGS: NOT AT ALL
2. FEELING DOWN, DEPRESSED OR HOPELESS: NOT AT ALL
SUM OF ALL RESPONSES TO PHQ QUESTIONS 1-9: 0

## 2025-07-16 NOTE — PROGRESS NOTES
Ariane Armenta presents today for   Chief Complaint   Patient presents with    Hyperlipidemia           \"Have you been to the ER, urgent care clinic since your last visit?  Hospitalized since your last visit?\"    NO    “Have you seen or consulted any other health care providers outside of [facility] since your last visit?”    NO

## 2025-07-16 NOTE — PROGRESS NOTES
[facility]:  7/22/2025, MRN: [id number]      Ariane Armenta is a 89 y.o. female and presents to clinic for Hyperlipidemia      Subjective:   The patient is an 89-year-old female with history of cerebral atrophy per head CT findings, white coat hypertension, urethral polyp seen on cystoscopy, microscopic hematuria, h/o breast cancer (declining further mammograms) s/p b/l mastectomy, glaucoma, chronic kidney disease stage III, history of TIA.     1.  CKD: Persisted.  Recent lab work shows that her estimated GFR is in the 40s range.  BP today: 145/49. +H/o white coat HTN. Her BP came down from her initial BP reading at the beginning of her apt today.     2. History of breast cancer: She declines additional screening.  In remission.     3.  Glaucoma: Eye exam: utd; she has regular exams and has had apts this year already.  Vision: blurry.  Treated with Lumigan eyedrops.      Patient Active Problem List    Diagnosis Date Noted    History of breast cancer 03/12/2018    White coat syndrome without hypertension 09/11/2017    Urethral polyp 12/27/2016    Microscopic hematuria 12/27/2016    Glaucoma 07/16/2013    H/O TIA (transient ischemic attack) and stroke 06/05/2013       Current Outpatient Medications   Medication Sig Dispense Refill    loratadine (CLARITIN) 10 MG tablet Take 1 tablet by mouth daily as needed (seasonal allergies) 30 tablet 11    aspirin 81 MG EC tablet Take 2 tablets by mouth nightly      Multiple Vitamins-Minerals (THERAPEUTIC MULTIVITAMIN-MINERALS) tablet Take 1 tablet by mouth daily      polyethyl glycol-propyl glycol 0.4-0.3 % (SYSTANE) 0.4-0.3 % ophthalmic solution 1 drop as needed for Dry Eyes      bimatoprost (LUMIGAN) 0.01 % SOLN ophthalmic drops Apply 1 drop to eye       No current facility-administered medications for this visit.       Allergies   Allergen Reactions    Brimonidine Itching    Pravastatin Myalgia and Swelling     Swelling in the throat, tongue

## 2025-07-22 ASSESSMENT — ENCOUNTER SYMPTOMS
COUGH: 0
SORE THROAT: 0
BLOOD IN STOOL: 0
EYE PAIN: 0
ANAL BLEEDING: 0
SHORTNESS OF BREATH: 0
ABDOMINAL PAIN: 0